# Patient Record
Sex: MALE | Race: WHITE | NOT HISPANIC OR LATINO | Employment: UNEMPLOYED | URBAN - METROPOLITAN AREA
[De-identification: names, ages, dates, MRNs, and addresses within clinical notes are randomized per-mention and may not be internally consistent; named-entity substitution may affect disease eponyms.]

---

## 2018-08-01 ENCOUNTER — RECORDS - HEALTHEAST (OUTPATIENT)
Dept: LAB | Facility: CLINIC | Age: 23
End: 2018-08-01

## 2018-08-02 LAB — HBA1C MFR BLD: 8.6 % (ref 4.2–6.1)

## 2018-08-06 ENCOUNTER — ANESTHESIA - HEALTHEAST (OUTPATIENT)
Dept: SURGERY | Facility: HOSPITAL | Age: 23
End: 2018-08-06

## 2018-08-06 ASSESSMENT — MIFFLIN-ST. JEOR: SCORE: 1800.25

## 2018-08-07 ENCOUNTER — SURGERY - HEALTHEAST (OUTPATIENT)
Dept: SURGERY | Facility: HOSPITAL | Age: 23
End: 2018-08-07

## 2018-08-09 ASSESSMENT — MIFFLIN-ST. JEOR: SCORE: 1806.6

## 2021-06-01 VITALS — HEIGHT: 70 IN | BODY MASS INDEX: 25.83 KG/M2 | WEIGHT: 180.4 LBS

## 2021-06-16 PROBLEM — M26.02 MAXILLARY HYPOPLASIA: Status: ACTIVE | Noted: 2018-08-07

## 2021-06-16 PROBLEM — E10.65 TYPE 1 DIABETES MELLITUS WITH HYPERGLYCEMIA (H): Status: ACTIVE | Noted: 2018-08-07

## 2021-06-19 NOTE — ANESTHESIA POSTPROCEDURE EVALUATION
Patient: Champ Baker  LEFORT 1 OSTEOTOMY BILATERAL SAGITTAL SPLIT RAMUS OSTEOTOMY  Anesthesia type: general    Patient location: PACU  Last vitals:   Vitals:    08/07/18 1230   BP: 130/76   Pulse: 97   Resp: 19   Temp:    SpO2: 95%     Post vital signs: stable  Level of consciousness: responds to simple questions and sedated  Post-anesthesia pain: pain controlled  Post-anesthesia nausea and vomiting: no  Pulmonary: unassisted, return to baseline  Cardiovascular: stable and blood pressure at baseline  Hydration: adequate  Anesthetic events: no    QCDR Measures:  ASA# 11 - Megan-op Cardiac Arrest: ASA11B - Patient did NOT experience unanticipated cardiac arrest  ASA# 12 - Megan-op Mortality Rate: ASA12B - Patient did NOT die  ASA# 13 - PACU Re-Intubation Rate: ASA13B - Patient did NOT require a new airway mgmt  ASA# 10 - Composite Anes Safety: ASA10A - No serious adverse event    Additional Notes:

## 2021-06-19 NOTE — ANESTHESIA CARE TRANSFER NOTE
Last vitals:   Vitals:    08/07/18 1150   BP: 114/58   Pulse: 85   Resp: 16   Temp: 36.7  C (98.1  F)   SpO2: 95%     Patient's level of consciousness is unresponsive  Spontaneous respirations: yes  Maintains airway independently: yes  Dentition unchanged: yes  Oropharynx: oropharynx clear of all foreign objects    QCDR Measures:  ASA# 20 - Surgical Safety Checklist: WHO surgical safety checklist completed prior to induction  PQRS# 430 - Adult PONV Prevention: 4558F - Pt received => 2 anti-emetic agents (different classes) preop & intraop  ASA# 8 - Peds PONV Prevention: NA - Not pediatric patient, not GA or 2 or more risk factors NOT present  PQRS# 424 - Megan-op Temp Management: 4559F - At least one body temp DOCUMENTED => 35.5C or 95.9F within required timeframe  PQRS# 426 - PACU Transfer Protocol: - Transfer of care checklist used  ASA# 14 - Acute Post-op Pain: ASA14B - Patient did NOT experience pain >= 7 out of 10

## 2021-06-19 NOTE — ANESTHESIA PREPROCEDURE EVALUATION
Anesthesia Evaluation      Patient summary reviewed   No history of anesthetic complications     Airway   Mallampati: I  Neck ROM: full   Pulmonary - negative ROS and normal exam                          Cardiovascular - negative ROS and normal exam  Exercise tolerance: > or = 4 METS  (-) murmur  Rhythm: regular  Rate: normal,    no murmur      Neuro/Psych    (+) anxiety/panic attacks,     Endo/Other    (+) diabetes mellitus type 1 well controlled and poorly controlled using insulin,      GI/Hepatic/Renal - negative ROS      Other findings: Tourette's  HbA1c recently 8.5. Has received a new feedback pump.   today      Dental - normal exam                        Anesthesia Plan  Planned anesthetic: general endotracheal  Deliberate hypotension  ASA 2   Induction: intravenous   Anesthetic plan and risks discussed with: patient and parent/guardian  Anesthesia plan special considerations: antiemetics,   Post-op plan: routine recovery

## 2022-04-05 DIAGNOSIS — Z11.59 ENCOUNTER FOR SCREENING FOR OTHER VIRAL DISEASES: Primary | ICD-10-CM

## 2022-04-29 ENCOUNTER — LAB (OUTPATIENT)
Dept: LAB | Facility: CLINIC | Age: 27
End: 2022-04-29
Attending: DENTIST
Payer: COMMERCIAL

## 2022-04-29 DIAGNOSIS — Z11.59 ENCOUNTER FOR SCREENING FOR OTHER VIRAL DISEASES: ICD-10-CM

## 2022-04-29 PROCEDURE — U0005 INFEC AGEN DETEC AMPLI PROBE: HCPCS

## 2022-04-29 PROCEDURE — U0003 INFECTIOUS AGENT DETECTION BY NUCLEIC ACID (DNA OR RNA); SEVERE ACUTE RESPIRATORY SYNDROME CORONAVIRUS 2 (SARS-COV-2) (CORONAVIRUS DISEASE [COVID-19]), AMPLIFIED PROBE TECHNIQUE, MAKING USE OF HIGH THROUGHPUT TECHNOLOGIES AS DESCRIBED BY CMS-2020-01-R: HCPCS

## 2022-04-29 RX ORDER — CLONAZEPAM 0.5 MG/1
0.5 TABLET ORAL 2 TIMES DAILY PRN
COMMUNITY

## 2022-04-29 RX ORDER — HYDROXYZINE HYDROCHLORIDE 10 MG/1
10 TABLET, FILM COATED ORAL EVERY 8 HOURS PRN
COMMUNITY

## 2022-04-29 RX ORDER — VENLAFAXINE HYDROCHLORIDE 150 MG/1
150 TABLET, EXTENDED RELEASE ORAL DAILY
COMMUNITY

## 2022-04-29 RX ORDER — BUSPIRONE HYDROCHLORIDE 5 MG/1
5 TABLET ORAL 2 TIMES DAILY
COMMUNITY

## 2022-04-30 LAB — SARS-COV-2 RNA RESP QL NAA+PROBE: NEGATIVE

## 2022-05-02 ENCOUNTER — ANESTHESIA EVENT (OUTPATIENT)
Dept: SURGERY | Facility: HOSPITAL | Age: 27
End: 2022-05-02
Payer: COMMERCIAL

## 2022-05-03 ENCOUNTER — HOSPITAL ENCOUNTER (OUTPATIENT)
Facility: HOSPITAL | Age: 27
Discharge: HOME | End: 2022-05-05
Attending: DENTIST | Admitting: DENTIST
Payer: COMMERCIAL

## 2022-05-03 ENCOUNTER — MEDICAL CORRESPONDENCE (OUTPATIENT)
Dept: HEALTH INFORMATION MANAGEMENT | Facility: CLINIC | Age: 27
End: 2022-05-03
Payer: COMMERCIAL

## 2022-05-03 ENCOUNTER — ANESTHESIA (OUTPATIENT)
Dept: SURGERY | Facility: HOSPITAL | Age: 27
End: 2022-05-03
Payer: COMMERCIAL

## 2022-05-03 DIAGNOSIS — S02.401K: Primary | ICD-10-CM

## 2022-05-03 LAB
GLUCOSE BLDC GLUCOMTR-MCNC: 146 MG/DL (ref 70–99)
GLUCOSE BLDC GLUCOMTR-MCNC: 200 MG/DL (ref 70–99)

## 2022-05-03 PROCEDURE — 250N000025 HC SEVOFLURANE, PER MIN: Performed by: DENTIST

## 2022-05-03 PROCEDURE — 250N000013 HC RX MED GY IP 250 OP 250 PS 637: Performed by: DENTIST

## 2022-05-03 PROCEDURE — 360N000077 HC SURGERY LEVEL 4, PER MIN: Performed by: DENTIST

## 2022-05-03 PROCEDURE — 999N000141 HC STATISTIC PRE-PROCEDURE NURSING ASSESSMENT: Performed by: DENTIST

## 2022-05-03 PROCEDURE — 278N000051 HC OR IMPLANT GENERAL: Performed by: DENTIST

## 2022-05-03 PROCEDURE — 258N000003 HC RX IP 258 OP 636: Performed by: NURSE ANESTHETIST, CERTIFIED REGISTERED

## 2022-05-03 PROCEDURE — 250N000011 HC RX IP 250 OP 636: Performed by: NURSE ANESTHETIST, CERTIFIED REGISTERED

## 2022-05-03 PROCEDURE — 250N000011 HC RX IP 250 OP 636: Performed by: ANESTHESIOLOGY

## 2022-05-03 PROCEDURE — 250N000009 HC RX 250: Performed by: NURSE ANESTHETIST, CERTIFIED REGISTERED

## 2022-05-03 PROCEDURE — C1762 CONN TISS, HUMAN(INC FASCIA): HCPCS | Performed by: DENTIST

## 2022-05-03 PROCEDURE — 258N000003 HC RX IP 258 OP 636: Performed by: DENTIST

## 2022-05-03 PROCEDURE — 710N000009 HC RECOVERY PHASE 1, LEVEL 1, PER MIN: Performed by: DENTIST

## 2022-05-03 PROCEDURE — 250N000011 HC RX IP 250 OP 636: Performed by: DENTIST

## 2022-05-03 PROCEDURE — C1713 ANCHOR/SCREW BN/BN,TIS/BN: HCPCS | Performed by: DENTIST

## 2022-05-03 PROCEDURE — 82962 GLUCOSE BLOOD TEST: CPT

## 2022-05-03 PROCEDURE — 99213 OFFICE O/P EST LOW 20 MIN: CPT | Performed by: INTERNAL MEDICINE

## 2022-05-03 PROCEDURE — 272N000001 HC OR GENERAL SUPPLY STERILE: Performed by: DENTIST

## 2022-05-03 PROCEDURE — 258N000003 HC RX IP 258 OP 636: Performed by: ANESTHESIOLOGY

## 2022-05-03 PROCEDURE — L8699 PROSTHETIC IMPLANT NOS: HCPCS | Performed by: DENTIST

## 2022-05-03 PROCEDURE — 370N000017 HC ANESTHESIA TECHNICAL FEE, PER MIN: Performed by: DENTIST

## 2022-05-03 PROCEDURE — C1760 CLOSURE DEV, VASC: HCPCS | Performed by: DENTIST

## 2022-05-03 PROCEDURE — 250N000009 HC RX 250: Performed by: DENTIST

## 2022-05-03 DEVICE — IMPLANTABLE DEVICE: Type: IMPLANTABLE DEVICE | Site: MOUTH | Status: FUNCTIONAL

## 2022-05-03 DEVICE — SCREW, MAXDRIVE, L1 MMF, DRILL FREE, TI-6AL-4V
Type: IMPLANTABLE DEVICE | Site: MOUTH | Status: FUNCTIONAL
Brand: LEVEL ONE CMF, STERILE

## 2022-05-03 DEVICE — GRAFT BONE CRUSH CANC 15ML 400075: Type: IMPLANTABLE DEVICE | Site: MOUTH | Status: FUNCTIONAL

## 2022-05-03 DEVICE — GRAFT BONE INFUSE BMP SM 7510200: Type: IMPLANTABLE DEVICE | Site: MOUTH | Status: FUNCTIONAL

## 2022-05-03 DEVICE — IMP SCR KLS DRILL-FREE MAXDRIVE MINI 2.0X7MM TI-6AL-4V: Type: IMPLANTABLE DEVICE | Site: MOUTH | Status: FUNCTIONAL

## 2022-05-03 RX ORDER — ONDANSETRON 4 MG/1
4 TABLET, ORALLY DISINTEGRATING ORAL EVERY 30 MIN PRN
Status: DISCONTINUED | OUTPATIENT
Start: 2022-05-03 | End: 2022-05-03 | Stop reason: HOSPADM

## 2022-05-03 RX ORDER — FAMOTIDINE 20 MG/1
20 TABLET, FILM COATED ORAL 2 TIMES DAILY
Status: DISCONTINUED | OUTPATIENT
Start: 2022-05-03 | End: 2022-05-05 | Stop reason: HOSPADM

## 2022-05-03 RX ORDER — HYDROMORPHONE HYDROCHLORIDE 1 MG/ML
0.5 INJECTION, SOLUTION INTRAMUSCULAR; INTRAVENOUS; SUBCUTANEOUS
Status: DISCONTINUED | OUTPATIENT
Start: 2022-05-03 | End: 2022-05-05 | Stop reason: HOSPADM

## 2022-05-03 RX ORDER — OXYCODONE HYDROCHLORIDE 15 MG/1
15 TABLET ORAL EVERY 4 HOURS PRN
Status: DISCONTINUED | OUTPATIENT
Start: 2022-05-03 | End: 2022-05-05 | Stop reason: HOSPADM

## 2022-05-03 RX ORDER — LIDOCAINE HYDROCHLORIDE AND EPINEPHRINE BITARTRATE 20; .01 MG/ML; MG/ML
INJECTION, SOLUTION SUBCUTANEOUS PRN
Status: DISCONTINUED | OUTPATIENT
Start: 2022-05-03 | End: 2022-05-03 | Stop reason: HOSPADM

## 2022-05-03 RX ORDER — POLYETHYLENE GLYCOL 3350 17 G/17G
17 POWDER, FOR SOLUTION ORAL DAILY
Status: DISCONTINUED | OUTPATIENT
Start: 2022-05-04 | End: 2022-05-05 | Stop reason: HOSPADM

## 2022-05-03 RX ORDER — ONDANSETRON 2 MG/ML
4 INJECTION INTRAMUSCULAR; INTRAVENOUS EVERY 30 MIN PRN
Status: DISCONTINUED | OUTPATIENT
Start: 2022-05-03 | End: 2022-05-03 | Stop reason: HOSPADM

## 2022-05-03 RX ORDER — HYDROXYZINE HYDROCHLORIDE 10 MG/1
10 TABLET, FILM COATED ORAL EVERY 8 HOURS PRN
Status: DISCONTINUED | OUTPATIENT
Start: 2022-05-03 | End: 2022-05-05 | Stop reason: HOSPADM

## 2022-05-03 RX ORDER — PROPOFOL 10 MG/ML
INJECTION, EMULSION INTRAVENOUS PRN
Status: DISCONTINUED | OUTPATIENT
Start: 2022-05-03 | End: 2022-05-03

## 2022-05-03 RX ORDER — LIDOCAINE HYDROCHLORIDE 20 MG/ML
INJECTION, SOLUTION INFILTRATION; PERINEURAL PRN
Status: DISCONTINUED | OUTPATIENT
Start: 2022-05-03 | End: 2022-05-03

## 2022-05-03 RX ORDER — CHLORHEXIDINE GLUCONATE ORAL RINSE 1.2 MG/ML
15 SOLUTION DENTAL ONCE
Status: COMPLETED | OUTPATIENT
Start: 2022-05-03 | End: 2022-05-03

## 2022-05-03 RX ORDER — ACETAMINOPHEN 325 MG/1
650 TABLET ORAL EVERY 4 HOURS PRN
Status: DISCONTINUED | OUTPATIENT
Start: 2022-05-06 | End: 2022-05-05 | Stop reason: HOSPADM

## 2022-05-03 RX ORDER — OXYCODONE HYDROCHLORIDE 5 MG/1
5 TABLET ORAL EVERY 4 HOURS PRN
Status: DISCONTINUED | OUTPATIENT
Start: 2022-05-03 | End: 2022-05-03 | Stop reason: HOSPADM

## 2022-05-03 RX ORDER — ONDANSETRON 2 MG/ML
4 INJECTION INTRAMUSCULAR; INTRAVENOUS EVERY 4 HOURS PRN
Status: DISCONTINUED | OUTPATIENT
Start: 2022-05-03 | End: 2022-05-03 | Stop reason: HOSPADM

## 2022-05-03 RX ORDER — DIPHENHYDRAMINE HYDROCHLORIDE 50 MG/ML
25 INJECTION INTRAMUSCULAR; INTRAVENOUS EVERY 6 HOURS PRN
Status: DISCONTINUED | OUTPATIENT
Start: 2022-05-03 | End: 2022-05-05 | Stop reason: HOSPADM

## 2022-05-03 RX ORDER — CEFAZOLIN SODIUM/WATER 2 G/20 ML
2 SYRINGE (ML) INTRAVENOUS
Status: COMPLETED | OUTPATIENT
Start: 2022-05-03 | End: 2022-05-03

## 2022-05-03 RX ORDER — ATORVASTATIN CALCIUM 10 MG/1
10 TABLET, FILM COATED ORAL AT BEDTIME
Status: DISCONTINUED | OUTPATIENT
Start: 2022-05-03 | End: 2022-05-05 | Stop reason: HOSPADM

## 2022-05-03 RX ORDER — LIDOCAINE 40 MG/G
CREAM TOPICAL
Status: DISCONTINUED | OUTPATIENT
Start: 2022-05-03 | End: 2022-05-03 | Stop reason: HOSPADM

## 2022-05-03 RX ORDER — FENTANYL CITRATE 50 UG/ML
25 INJECTION, SOLUTION INTRAMUSCULAR; INTRAVENOUS EVERY 5 MIN PRN
Status: DISCONTINUED | OUTPATIENT
Start: 2022-05-03 | End: 2022-05-03 | Stop reason: HOSPADM

## 2022-05-03 RX ORDER — ACETAMINOPHEN 325 MG/1
975 TABLET ORAL EVERY 8 HOURS
Status: DISCONTINUED | OUTPATIENT
Start: 2022-05-03 | End: 2022-05-05 | Stop reason: HOSPADM

## 2022-05-03 RX ORDER — NALOXONE HYDROCHLORIDE 0.4 MG/ML
0.2 INJECTION, SOLUTION INTRAMUSCULAR; INTRAVENOUS; SUBCUTANEOUS
Status: DISCONTINUED | OUTPATIENT
Start: 2022-05-03 | End: 2022-05-05 | Stop reason: HOSPADM

## 2022-05-03 RX ORDER — NICOTINE POLACRILEX 4 MG
15-30 LOZENGE BUCCAL
Status: DISCONTINUED | OUTPATIENT
Start: 2022-05-03 | End: 2022-05-05 | Stop reason: HOSPADM

## 2022-05-03 RX ORDER — ONDANSETRON 4 MG/1
8 TABLET, ORALLY DISINTEGRATING ORAL EVERY 6 HOURS PRN
Status: DISCONTINUED | OUTPATIENT
Start: 2022-05-03 | End: 2022-05-03 | Stop reason: HOSPADM

## 2022-05-03 RX ORDER — OXYCODONE HYDROCHLORIDE 5 MG/1
10 TABLET ORAL EVERY 4 HOURS PRN
Status: DISCONTINUED | OUTPATIENT
Start: 2022-05-03 | End: 2022-05-05 | Stop reason: HOSPADM

## 2022-05-03 RX ORDER — BUSPIRONE HYDROCHLORIDE 5 MG/1
5 TABLET ORAL 2 TIMES DAILY
Status: DISCONTINUED | OUTPATIENT
Start: 2022-05-03 | End: 2022-05-05 | Stop reason: HOSPADM

## 2022-05-03 RX ORDER — HYDROMORPHONE HYDROCHLORIDE 1 MG/ML
0.2 INJECTION, SOLUTION INTRAMUSCULAR; INTRAVENOUS; SUBCUTANEOUS EVERY 5 MIN PRN
Status: DISCONTINUED | OUTPATIENT
Start: 2022-05-03 | End: 2022-05-03 | Stop reason: HOSPADM

## 2022-05-03 RX ORDER — NALOXONE HYDROCHLORIDE 0.4 MG/ML
0.4 INJECTION, SOLUTION INTRAMUSCULAR; INTRAVENOUS; SUBCUTANEOUS
Status: DISCONTINUED | OUTPATIENT
Start: 2022-05-03 | End: 2022-05-05 | Stop reason: HOSPADM

## 2022-05-03 RX ORDER — AMOXICILLIN 250 MG
1 CAPSULE ORAL 2 TIMES DAILY
Status: DISCONTINUED | OUTPATIENT
Start: 2022-05-03 | End: 2022-05-05 | Stop reason: HOSPADM

## 2022-05-03 RX ORDER — SODIUM CHLORIDE 9 MG/ML
INJECTION, SOLUTION INTRAVENOUS CONTINUOUS
Status: DISCONTINUED | OUTPATIENT
Start: 2022-05-03 | End: 2022-05-05 | Stop reason: HOSPADM

## 2022-05-03 RX ORDER — ONDANSETRON 4 MG/1
4 TABLET, ORALLY DISINTEGRATING ORAL EVERY 6 HOURS PRN
Status: DISCONTINUED | OUTPATIENT
Start: 2022-05-03 | End: 2022-05-05 | Stop reason: HOSPADM

## 2022-05-03 RX ORDER — AMPICILLIN 1 G/1
1 INJECTION, POWDER, FOR SOLUTION INTRAMUSCULAR; INTRAVENOUS EVERY 8 HOURS
Status: DISCONTINUED | OUTPATIENT
Start: 2022-05-03 | End: 2022-05-05 | Stop reason: HOSPADM

## 2022-05-03 RX ORDER — MEPERIDINE HYDROCHLORIDE 25 MG/ML
12.5 INJECTION INTRAMUSCULAR; INTRAVENOUS; SUBCUTANEOUS
Status: DISCONTINUED | OUTPATIENT
Start: 2022-05-03 | End: 2022-05-03

## 2022-05-03 RX ORDER — PROCHLORPERAZINE MALEATE 10 MG
10 TABLET ORAL EVERY 6 HOURS PRN
Status: DISCONTINUED | OUTPATIENT
Start: 2022-05-03 | End: 2022-05-05 | Stop reason: HOSPADM

## 2022-05-03 RX ORDER — KETAMINE HYDROCHLORIDE 10 MG/ML
INJECTION INTRAMUSCULAR; INTRAVENOUS PRN
Status: DISCONTINUED | OUTPATIENT
Start: 2022-05-03 | End: 2022-05-03

## 2022-05-03 RX ORDER — METHYLPREDNISOLONE SODIUM SUCCINATE 125 MG/2ML
125 INJECTION, POWDER, LYOPHILIZED, FOR SOLUTION INTRAMUSCULAR; INTRAVENOUS ONCE
Status: DISCONTINUED | OUTPATIENT
Start: 2022-05-03 | End: 2022-05-03 | Stop reason: HOSPADM

## 2022-05-03 RX ORDER — DIPHENHYDRAMINE HCL 25 MG
25 CAPSULE ORAL EVERY 6 HOURS PRN
Status: DISCONTINUED | OUTPATIENT
Start: 2022-05-03 | End: 2022-05-05 | Stop reason: HOSPADM

## 2022-05-03 RX ORDER — KETOROLAC TROMETHAMINE 15 MG/ML
15 INJECTION, SOLUTION INTRAMUSCULAR; INTRAVENOUS EVERY 6 HOURS
Status: COMPLETED | OUTPATIENT
Start: 2022-05-03 | End: 2022-05-04

## 2022-05-03 RX ORDER — DEXAMETHASONE SODIUM PHOSPHATE 10 MG/ML
INJECTION, SOLUTION INTRAMUSCULAR; INTRAVENOUS PRN
Status: DISCONTINUED | OUTPATIENT
Start: 2022-05-03 | End: 2022-05-03

## 2022-05-03 RX ORDER — LIDOCAINE 40 MG/G
CREAM TOPICAL
Status: DISCONTINUED | OUTPATIENT
Start: 2022-05-03 | End: 2022-05-05 | Stop reason: HOSPADM

## 2022-05-03 RX ORDER — FENTANYL CITRATE 50 UG/ML
25 INJECTION, SOLUTION INTRAMUSCULAR; INTRAVENOUS
Status: DISCONTINUED | OUTPATIENT
Start: 2022-05-03 | End: 2022-05-03 | Stop reason: HOSPADM

## 2022-05-03 RX ORDER — SODIUM CHLORIDE, SODIUM LACTATE, POTASSIUM CHLORIDE, CALCIUM CHLORIDE 600; 310; 30; 20 MG/100ML; MG/100ML; MG/100ML; MG/100ML
INJECTION, SOLUTION INTRAVENOUS CONTINUOUS
Status: DISCONTINUED | OUTPATIENT
Start: 2022-05-03 | End: 2022-05-03 | Stop reason: HOSPADM

## 2022-05-03 RX ORDER — BISACODYL 10 MG
10 SUPPOSITORY, RECTAL RECTAL DAILY PRN
Status: DISCONTINUED | OUTPATIENT
Start: 2022-05-03 | End: 2022-05-05 | Stop reason: HOSPADM

## 2022-05-03 RX ORDER — ONDANSETRON 2 MG/ML
INJECTION INTRAMUSCULAR; INTRAVENOUS PRN
Status: DISCONTINUED | OUTPATIENT
Start: 2022-05-03 | End: 2022-05-03

## 2022-05-03 RX ORDER — CLONAZEPAM 0.5 MG/1
0.5 TABLET ORAL 2 TIMES DAILY PRN
Status: DISCONTINUED | OUTPATIENT
Start: 2022-05-03 | End: 2022-05-05 | Stop reason: HOSPADM

## 2022-05-03 RX ORDER — VENLAFAXINE HYDROCHLORIDE 150 MG/1
150 CAPSULE, EXTENDED RELEASE ORAL DAILY
Status: DISCONTINUED | OUTPATIENT
Start: 2022-05-03 | End: 2022-05-05 | Stop reason: HOSPADM

## 2022-05-03 RX ORDER — GLYCOPYRROLATE 0.2 MG/ML
INJECTION, SOLUTION INTRAMUSCULAR; INTRAVENOUS PRN
Status: DISCONTINUED | OUTPATIENT
Start: 2022-05-03 | End: 2022-05-03

## 2022-05-03 RX ORDER — DEXTROSE MONOHYDRATE 25 G/50ML
25-50 INJECTION, SOLUTION INTRAVENOUS
Status: DISCONTINUED | OUTPATIENT
Start: 2022-05-03 | End: 2022-05-05 | Stop reason: HOSPADM

## 2022-05-03 RX ORDER — FENTANYL CITRATE 50 UG/ML
INJECTION, SOLUTION INTRAMUSCULAR; INTRAVENOUS PRN
Status: DISCONTINUED | OUTPATIENT
Start: 2022-05-03 | End: 2022-05-03

## 2022-05-03 RX ORDER — ONDANSETRON 2 MG/ML
4 INJECTION INTRAMUSCULAR; INTRAVENOUS EVERY 6 HOURS PRN
Status: DISCONTINUED | OUTPATIENT
Start: 2022-05-03 | End: 2022-05-05 | Stop reason: HOSPADM

## 2022-05-03 RX ADMIN — CHLORHEXIDINE GLUCONATE 15 ML: 1.2 SOLUTION ORAL at 12:32

## 2022-05-03 RX ADMIN — ROCURONIUM BROMIDE 50 MG: 50 INJECTION, SOLUTION INTRAVENOUS at 13:29

## 2022-05-03 RX ADMIN — PHENYLEPHRINE HYDROCHLORIDE 100 MCG: 10 INJECTION INTRAVENOUS at 15:19

## 2022-05-03 RX ADMIN — KETOROLAC TROMETHAMINE 15 MG: 15 INJECTION, SOLUTION INTRAMUSCULAR; INTRAVENOUS at 20:12

## 2022-05-03 RX ADMIN — ONDANSETRON 4 MG: 2 INJECTION INTRAMUSCULAR; INTRAVENOUS at 17:43

## 2022-05-03 RX ADMIN — PHENYLEPHRINE HYDROCHLORIDE 50 MCG: 10 INJECTION INTRAVENOUS at 14:51

## 2022-05-03 RX ADMIN — FENTANYL CITRATE 25 MCG: 50 INJECTION INTRAMUSCULAR; INTRAVENOUS at 17:36

## 2022-05-03 RX ADMIN — VENLAFAXINE HYDROCHLORIDE 150 MG: 150 CAPSULE, EXTENDED RELEASE ORAL at 20:13

## 2022-05-03 RX ADMIN — HYDROMORPHONE HYDROCHLORIDE 0.5 MG: 1 INJECTION, SOLUTION INTRAMUSCULAR; INTRAVENOUS; SUBCUTANEOUS at 18:45

## 2022-05-03 RX ADMIN — PROCHLORPERAZINE EDISYLATE 5 MG: 5 INJECTION INTRAMUSCULAR; INTRAVENOUS at 18:23

## 2022-05-03 RX ADMIN — PROPOFOL 200 MG: 10 INJECTION, EMULSION INTRAVENOUS at 13:29

## 2022-05-03 RX ADMIN — ATORVASTATIN CALCIUM 10 MG: 10 TABLET, FILM COATED ORAL at 20:15

## 2022-05-03 RX ADMIN — GLYCOPYRROLATE 0.2 MG: 0.2 INJECTION, SOLUTION INTRAMUSCULAR; INTRAVENOUS at 13:29

## 2022-05-03 RX ADMIN — FENTANYL CITRATE 50 MCG: 50 INJECTION, SOLUTION INTRAMUSCULAR; INTRAVENOUS at 13:29

## 2022-05-03 RX ADMIN — LIDOCAINE HYDROCHLORIDE 100 MG: 20 INJECTION, SOLUTION INFILTRATION; PERINEURAL at 13:29

## 2022-05-03 RX ADMIN — PHENYLEPHRINE HYDROCHLORIDE 100 MCG: 10 INJECTION INTRAVENOUS at 14:55

## 2022-05-03 RX ADMIN — PHENYLEPHRINE HYDROCHLORIDE 100 MCG: 10 INJECTION INTRAVENOUS at 13:29

## 2022-05-03 RX ADMIN — SODIUM CHLORIDE, POTASSIUM CHLORIDE, SODIUM LACTATE AND CALCIUM CHLORIDE: 600; 310; 30; 20 INJECTION, SOLUTION INTRAVENOUS at 18:13

## 2022-05-03 RX ADMIN — Medication 2 G: at 13:16

## 2022-05-03 RX ADMIN — KETAMINE HYDROCHLORIDE 50 MG: 10 INJECTION, SOLUTION INTRAMUSCULAR; INTRAVENOUS at 13:29

## 2022-05-03 RX ADMIN — PHENYLEPHRINE HYDROCHLORIDE 50 MCG: 10 INJECTION INTRAVENOUS at 15:15

## 2022-05-03 RX ADMIN — SODIUM CHLORIDE: 9 INJECTION, SOLUTION INTRAVENOUS at 19:04

## 2022-05-03 RX ADMIN — SENNOSIDES AND DOCUSATE SODIUM 1 TABLET: 50; 8.6 TABLET ORAL at 20:15

## 2022-05-03 RX ADMIN — BUSPIRONE HYDROCHLORIDE 5 MG: 5 TABLET ORAL at 20:36

## 2022-05-03 RX ADMIN — MIDAZOLAM 2 MG: 1 INJECTION INTRAMUSCULAR; INTRAVENOUS at 13:17

## 2022-05-03 RX ADMIN — SUGAMMADEX 180 MG: 100 INJECTION, SOLUTION INTRAVENOUS at 16:32

## 2022-05-03 RX ADMIN — FENTANYL CITRATE 25 MCG: 50 INJECTION INTRAMUSCULAR; INTRAVENOUS at 17:52

## 2022-05-03 RX ADMIN — DEXAMETHASONE SODIUM PHOSPHATE 5 MG: 10 INJECTION, SOLUTION INTRAMUSCULAR; INTRAVENOUS at 14:37

## 2022-05-03 RX ADMIN — HYDROMORPHONE HYDROCHLORIDE 0.5 MG: 1 INJECTION, SOLUTION INTRAMUSCULAR; INTRAVENOUS; SUBCUTANEOUS at 14:23

## 2022-05-03 RX ADMIN — FENTANYL CITRATE 25 MCG: 50 INJECTION INTRAMUSCULAR; INTRAVENOUS at 17:47

## 2022-05-03 RX ADMIN — SODIUM CHLORIDE, POTASSIUM CHLORIDE, SODIUM LACTATE AND CALCIUM CHLORIDE: 600; 310; 30; 20 INJECTION, SOLUTION INTRAVENOUS at 14:55

## 2022-05-03 RX ADMIN — PHENYLEPHRINE HYDROCHLORIDE 50 MCG: 10 INJECTION INTRAVENOUS at 15:03

## 2022-05-03 RX ADMIN — ACETAMINOPHEN 975 MG: 325 TABLET ORAL at 20:14

## 2022-05-03 RX ADMIN — CLONAZEPAM 0.5 MG: 0.5 TABLET ORAL at 23:43

## 2022-05-03 RX ADMIN — FENTANYL CITRATE 50 MCG: 50 INJECTION, SOLUTION INTRAMUSCULAR; INTRAVENOUS at 13:24

## 2022-05-03 RX ADMIN — FENTANYL CITRATE 25 MCG: 50 INJECTION INTRAMUSCULAR; INTRAVENOUS at 17:23

## 2022-05-03 RX ADMIN — HYDROMORPHONE HYDROCHLORIDE 0.5 MG: 1 INJECTION, SOLUTION INTRAMUSCULAR; INTRAVENOUS; SUBCUTANEOUS at 13:59

## 2022-05-03 RX ADMIN — SODIUM CHLORIDE, POTASSIUM CHLORIDE, SODIUM LACTATE AND CALCIUM CHLORIDE: 600; 310; 30; 20 INJECTION, SOLUTION INTRAVENOUS at 12:03

## 2022-05-03 RX ADMIN — FAMOTIDINE 20 MG: 20 TABLET ORAL at 20:15

## 2022-05-03 RX ADMIN — AMPICILLIN SODIUM 1 G: 1 INJECTION, POWDER, FOR SOLUTION INTRAMUSCULAR; INTRAVENOUS at 20:17

## 2022-05-03 RX ADMIN — ONDANSETRON 4 MG: 2 INJECTION INTRAMUSCULAR; INTRAVENOUS at 16:26

## 2022-05-03 NOTE — ANESTHESIA PROCEDURE NOTES
Airway         Procedure Start/Stop Times: 5/3/2022 1:37 PM  Staff -        Anesthesiologist:  Franco Christie MD       Performed By: anesthesiologistIndications and Patient Condition       Indications for airway management: jimmie-procedural       Induction type:intravenous       Mask difficulty assessment: 1 - vent by mask    Final Airway Details       Final airway type: endotracheal airway       Successful airway: Nasal and GAVINO  Endotracheal Airway Details        ETT size (mm): 7.0       Cuffed: yes       Successful intubation technique: direct laryngoscopy       DL Blade Type: [place] 2       Grade View of Cords: 1       Adjucts: magill forceps       Position: Center       Measured from: nares    Post intubation assessment        Placement verified by: capnometry, equal breath sounds and chest rise        Number of attempts at approach: 1       Number of other approaches attempted: 0       Ease of procedure: easy       Dentition: Intact    Medication(s) Administered   Medication Administration Time: 5/3/2022 1:37 PM

## 2022-05-03 NOTE — PHARMACY-ADMISSION MEDICATION HISTORY
Pharmacy Note - Admission Medication History   ______________________________________________________________________    Prior To Admission (PTA) med list completed and updated in EMR.       PTA Med List   Medication Sig Last Dose     atorvastatin (LIPITOR) 10 MG tablet [ATORVASTATIN (LIPITOR) 10 MG TABLET] Take 10 mg by mouth at bedtime. 5/2/2022 at hs     busPIRone (BUSPAR) 5 MG tablet Take 5 mg by mouth 2 times daily 5/2/2022 at pm     clonazePAM (KLONOPIN) 0.5 MG tablet Take 0.5 mg by mouth 2 times daily as needed for anxiety 5/2/2022 at pm     glucagon 1 MG kit 1 mg as needed      hydrOXYzine (ATARAX) 10 MG tablet Take 10 mg by mouth every 8 hours as needed for anxiety      insulin aspart (NovoLOG) inject - to fill ambulatory pump by Device route See Admin Instructions 110 units daily      venlafaxine (EFFEXOR-ER) 150 MG 24 hr tablet Take 150 mg by mouth daily 5/2/2022 at am       Information source(s): Patient and Clinic records    Method of interview communication: in-person    Patient was asked about OTC/herbal products specifically.  PTA med list reflects this.    Based on the pharmacist's assessment, the PTA med list information appears reliable    Allergies were reviewed, assessed, and updated with the patient.      Medications available for use during hospital stay: insulin pump .      Thank you for the opportunity to participate in the care of this patient.      Eliot Wiggins [facility]     5/3/2022     11:51 AM

## 2022-05-03 NOTE — ANESTHESIA POSTPROCEDURE EVALUATION
Patient: Champ Baker    Procedure: Procedure(s):  OPEN TREATMENT OF MAXILLARY FRACTURE DUE TO NONUNION       Anesthesia Type:  General    Note:  Disposition: Outpatient   Postop Pain Control: Uneventful            Sign Out: Well controlled pain   PONV: No   Neuro/Psych: Uneventful            Sign Out: Acceptable/Baseline neuro status   Airway/Respiratory: Uneventful            Sign Out: Acceptable/Baseline resp. status   CV/Hemodynamics: Uneventful            Sign Out: Acceptable CV status; No obvious hypovolemia; No obvious fluid overload   Other NRE: NONE   DID A NON-ROUTINE EVENT OCCUR? No           Last vitals:  Vitals Value Taken Time   /79 05/03/22 1653   Temp 37.1  C (98.8  F) 05/03/22 1653   Pulse 100 05/03/22 1649   Resp 14 05/03/22 1653   SpO2 98 % 05/03/22 1653       Electronically Signed By: Chetan Ramsey MD  May 3, 2022  5:08 PM

## 2022-05-03 NOTE — ANESTHESIA CARE TRANSFER NOTE
Patient: Champ Baker    Procedure: Procedure(s):  OPEN TREATMENT OF MAXILLARY FRACTURE DUE TO NONUNION       Diagnosis: LeFort I fracture with nonunion [S02.411K]  Diagnosis Additional Information: No value filed.    Anesthesia Type:   General     Note:    Oropharynx: oropharynx clear of all foreign objects and spontaneously breathing  Level of Consciousness: drowsy  Oxygen Supplementation: face mask  Level of Supplemental Oxygen (L/min / FiO2): 8  Independent Airway: airway patency satisfactory and stable  Dentition: dentition unchanged  Vital Signs Stable: post-procedure vital signs reviewed and stable  Report to RN Given: handoff report given  Patient transferred to: PACU  Comments: Patient spontaneously breathing.  Tidal volumes > 400ml.  Following commands, suctioned and extubated with balloon down.  O2 via mask at 8L.  To PACU, VSS, SBAR report to RN per institutional handoff policy and procedure.Transfer of care.    Dime sized red spot noted on pts forehead after extubation. ETT had been supported by sponge over towel-turban at end of case. Spot assessed by Dr Bernabe in PACU, PACU RN aware.  Handoff Report: Identifed the Patient, Identified the Reponsible Provider, Reviewed the pertinent medical history, Discussed the surgical course, Reviewed Intra-OP anesthesia mangement and issues during anesthesia, Set expectations for post-procedure period and Allowed opportunity for questions and acknowledgement of understanding      Vitals:  Vitals Value Taken Time   /79 05/03/22 1653   Temp 37.1  C (98.8  F) 05/03/22 1653   Pulse     Resp 14 05/03/22 1653   SpO2 98 % 05/03/22 1653       Electronically Signed By: AP Hinojosa CRNA  May 3, 2022  4:54 PM

## 2022-05-03 NOTE — OP NOTE
[facility]  Operative Note    Pre-operative diagnosis: LeFort I fracture with nonunion [S02.411K]   Post-operative diagnosis * No post-op diagnosis entered *   Procedure: Procedure(s):  OPEN TREATMENT OF MAXILLARY FRACTURE DUE TO NONUNION   Surgeon: Luis Enrique Bernabe DDS   Assistants(s): Vibha Grant   Anesthesia: General    Estimated blood loss: 300 ml    Total IV fluids: (See anesthesia record)   Blood transfusion: No transfusion was given during surgery   Total urine output: (See anesthesia record)   Drains: None   Specimens: None   Implants: 4X KLS alistair Washington fixation plates     Findings:   as expected.   Complications: None.   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  Several years ago he had undergone a large maxillary advancement and mandibular setback.  He had initial nice outcome for years but recently noted a change in his bite and mobility of his midface.  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  We discussed treatment options, risks and benefits.  The patient elected to move ahead with open debridement, reduction and internal fixation.  The potential risk and complications were discussed to include but not limited to; infection, bleeding, nonunion, malunion, damage to teeth, infraorbital nerve deficits and the potential need for additional surgery or treatment.    Patient was brought to the main operating room.  He was placed in the operating room table in supine position.  General anesthesia was induced with intravenous agents.  The patient was nasotracheally intubated.  Bilateral breath sounds were confirmed.  Nasotracheal tube was secured in usual fashion.  An injection timeout was completed.  Lidocaine with epinephrine was injected around the  planned surgical sites.  The patient was prepped and draped for surgery.  A timeout was completed.    A throat pack was placed using a Ray-Zhen gauze.  An incision was made in the prior vestibular scar 3 mm superior to the mucogingival junction from the zygomaxillary junction on 1 side to the zygomaxillary junction of the other.  There was considerable scar tissue and bony overgrowth of the fixation plates.  Very careful dissection was completed from the pterygomaxillary junction through the piriform rims.  The nasal floor mucosa was elevated.  LeFort fixation plates were exposed and removed.  Bone had grown over some of the fixation plates.  2 of the fixation plates were broken.  There was a fibrous union at the LeFort I level.  Careful dissection completed of the fibrous interface.  The dissection and osteotomies were completed through the LeFort I level and the pterygomaxillary junction through the piriform rim using a Piezo saw.  A single ball osteotome was used to the lateral nasal walls.  A double ball osteotome was used to separate the nasal septum.  The maxilla was down fractured.  Additional dissection was completed of fibrous scar tissue and the interface of the osteotomy segments.  The maxilla was further mobilized.  The wounds were thoroughly irrigated and suctioned.  There was good hemostasis.  The maxilla was mobilized using Chesetr disimpaction forceps.  The patient was placed into intermaxillary fixation using 26-gauge wire, ANISA Snowden surgical guide and ANISA Snowden rigid fixation arch bars.  Maxilla was reduced and fixated with 4 Holden midface bone plates produced by ANISA Snowden.  Intermaxillary fixation was released and the patient's occlusion was coincident with the splint and the surgical plan.  The wounds were thoroughly irrigated.  There was good hemostasis.  A passive alar base cinch suture was placed using 3-0 Vicryl suture.  A 1 cm VY closure was completed the maxillary vestibule using 4-0 Vicryl  suture.  The throat pack was removed.  An orogastric tube was passed suctioned and removed.  All sponge and needle counts were correct x2.  The patient was transported to the PACU in stable condition.

## 2022-05-03 NOTE — ANESTHESIA PREPROCEDURE EVALUATION
Anesthesia Pre-Procedure Evaluation    Patient: Champ Baker   MRN: [id number] : 1995        Procedure : Procedure(s):  OPEN TREATMENT OF MAXILLARY FRACTURE DUE TO NONUNION          Past Medical History:   Diagnosis Date     Anxiety      Diabetes mellitus type 1 (H)      Maxillary hypoplasia      Mixed hyperlipidemia      Situational depression       Past Surgical History:   Procedure Laterality Date     FINGER SURGERY       MANDIBULAR SAGITTAL SPLIT OSTEOTOMY W/ INTERNAL FIXATION N/A 2018    Procedure: LEFORT 1 OSTEOTOMY BILATERAL SAGITTAL SPLIT RAMUS OSTEOTOMY;  Surgeon: Luis Enrique Bernabe DDS;  Location: [facility] [place];  Service:      PERIPHERALLY INSERTED CENTRAL CATHETER INSERTION       WISDOM TOOTH EXTRACTION        Allergies   Allergen Reactions     Other Environmental Allergy      Cat Dander, Hay fever     Reglan [Metoclopramide] Anxiety      Social History     Tobacco Use     Smoking status: Never Smoker     Smokeless tobacco: Never Used   Substance Use Topics     Alcohol use: Yes     Comment: Alcoholic Drinks/day: rare      Wt Readings from Last 1 Encounters:   22 90.3 kg (199 lb 1.6 oz)        Anesthesia Evaluation   Pt has not had prior anesthetic         ROS/MED HX  ENT/Pulmonary:  - neg pulmonary ROS     Neurologic:  - neg neurologic ROS     Cardiovascular:  - neg cardiovascular ROS     METS/Exercise Tolerance: >4 METS    Hematologic:  - neg hematologic  ROS     Musculoskeletal:  - neg musculoskeletal ROS     GI/Hepatic:  - neg GI/hepatic ROS     Renal/Genitourinary:  - neg Renal ROS     Endo:     (+) type I DM, Last HgA1c: 7.3, Using insulin, - using insulin pump.     Psychiatric/Substance Use:     (+) psychiatric history anxiety     Infectious Disease:  - neg infectious disease ROS     Malignancy:  - neg malignancy ROS     Other:  - neg other ROS          Physical Exam    Airway  airway exam normal      Mallampati: I   TM distance: > 3 FB   Neck ROM: full   Mouth opening: > 3  cm    Respiratory Devices and Support         Dental  no notable dental history         Cardiovascular   cardiovascular exam normal          Pulmonary   pulmonary exam normal                OUTSIDE LABS:  CBC: No results found for: WBC, HGB, HCT, PLT  BMP:   Lab Results   Component Value Date     08/10/2018     08/09/2018    POTASSIUM 4.2 08/10/2018    POTASSIUM 3.9 08/09/2018    CHLORIDE 101 08/10/2018    CHLORIDE 105 08/09/2018    CO2 28 08/10/2018    CO2 30 08/09/2018    BUN 13 08/10/2018    BUN 16 08/09/2018    CR 0.83 08/10/2018    CR 0.82 08/09/2018     (H) 05/03/2022     08/10/2018     COAGS: No results found for: PTT, INR, FIBR  POC: No results found for: BGM, HCG, HCGS  HEPATIC: No results found for: ALBUMIN, PROTTOTAL, ALT, AST, GGT, ALKPHOS, BILITOTAL, BILIDIRECT, RICH  OTHER:   Lab Results   Component Value Date    A1C 8.6 (H) 08/01/2018    MANUEL 8.9 08/10/2018    PHOS 3.0 08/10/2018    MAG 2.4 08/08/2018       Anesthesia Plan    ASA Status:  2   NPO Status:  NPO Appropriate    Anesthesia Type: General.     - Airway: ETT   Induction: Intravenous, Propofol.   Maintenance: Balanced.   Techniques and Equipment:     - Airway: Nasal GAVINO         Consents    Anesthesia Plan(s) and associated risks, benefits, and realistic alternatives discussed. Questions answered and patient/representative(s) expressed understanding.    - Discussed:     - Discussed with:  Patient, Parent (Mother and/or Father)      - Extended Intubation/Ventilatory Support Discussed: No.      - Patient is DNR/DNI Status: No    Use of blood products discussed: No .     Postoperative Care    Pain management: IV analgesics, Multi-modal analgesia.   PONV prophylaxis: Ondansetron (or other 5HT-3), Dexamethasone or Solumedrol, Droperidol or Haldol     Comments:    Other Comments: 50 mg ketamine IV on induction.  Continue insulin pump and continuous glucose monitor in the OR.            Franco Christie MD

## 2022-05-04 LAB
GLUCOSE BLDC GLUCOMTR-MCNC: 168 MG/DL (ref 70–99)
GLUCOSE BLDC GLUCOMTR-MCNC: 181 MG/DL (ref 70–99)
GLUCOSE BLDC GLUCOMTR-MCNC: 191 MG/DL (ref 70–99)
HBA1C MFR BLD: 7.8 %

## 2022-05-04 PROCEDURE — 83036 HEMOGLOBIN GLYCOSYLATED A1C: CPT | Performed by: INTERNAL MEDICINE

## 2022-05-04 PROCEDURE — 250N000013 HC RX MED GY IP 250 OP 250 PS 637: Performed by: DENTIST

## 2022-05-04 PROCEDURE — 99226 PR SUBSEQUENT OBSERVATION CARE,LEVEL III: CPT | Performed by: INTERNAL MEDICINE

## 2022-05-04 PROCEDURE — 250N000011 HC RX IP 250 OP 636: Performed by: DENTIST

## 2022-05-04 PROCEDURE — 82962 GLUCOSE BLOOD TEST: CPT

## 2022-05-04 PROCEDURE — 36415 COLL VENOUS BLD VENIPUNCTURE: CPT | Performed by: INTERNAL MEDICINE

## 2022-05-04 RX ORDER — NICOTINE POLACRILEX 4 MG
15-30 LOZENGE BUCCAL
Status: DISCONTINUED | OUTPATIENT
Start: 2022-05-04 | End: 2022-05-05 | Stop reason: HOSPADM

## 2022-05-04 RX ORDER — DEXTROSE MONOHYDRATE 25 G/50ML
25-50 INJECTION, SOLUTION INTRAVENOUS
Status: DISCONTINUED | OUTPATIENT
Start: 2022-05-04 | End: 2022-05-05 | Stop reason: HOSPADM

## 2022-05-04 RX ORDER — HYDROCODONE BITARTRATE AND ACETAMINOPHEN 5; 325 MG/1; MG/1
1-2 TABLET ORAL EVERY 4 HOURS PRN
Qty: 20 TABLET | Refills: 0 | Status: SHIPPED | OUTPATIENT
Start: 2022-05-04

## 2022-05-04 RX ORDER — AMOXICILLIN 500 MG/1
500 CAPSULE ORAL 3 TIMES DAILY
Qty: 21 CAPSULE | Refills: 0 | Status: SHIPPED | OUTPATIENT
Start: 2022-05-04 | End: 2022-05-11

## 2022-05-04 RX ORDER — IBUPROFEN 600 MG/1
600 TABLET, FILM COATED ORAL EVERY 6 HOURS PRN
Qty: 16 TABLET | Refills: 0 | Status: SHIPPED | OUTPATIENT
Start: 2022-05-04

## 2022-05-04 RX ADMIN — AMPICILLIN SODIUM 1 G: 1 INJECTION, POWDER, FOR SOLUTION INTRAMUSCULAR; INTRAVENOUS at 19:59

## 2022-05-04 RX ADMIN — VENLAFAXINE HYDROCHLORIDE 150 MG: 150 CAPSULE, EXTENDED RELEASE ORAL at 09:24

## 2022-05-04 RX ADMIN — BUSPIRONE HYDROCHLORIDE 5 MG: 5 TABLET ORAL at 20:15

## 2022-05-04 RX ADMIN — KETOROLAC TROMETHAMINE 15 MG: 15 INJECTION, SOLUTION INTRAMUSCULAR; INTRAVENOUS at 14:46

## 2022-05-04 RX ADMIN — ATORVASTATIN CALCIUM 10 MG: 10 TABLET, FILM COATED ORAL at 20:39

## 2022-05-04 RX ADMIN — OXYCODONE HYDROCHLORIDE 10 MG: 5 TABLET ORAL at 00:22

## 2022-05-04 RX ADMIN — OXYCODONE HYDROCHLORIDE 10 MG: 5 TABLET ORAL at 06:52

## 2022-05-04 RX ADMIN — ACETAMINOPHEN 975 MG: 325 TABLET ORAL at 11:49

## 2022-05-04 RX ADMIN — OXYCODONE HYDROCHLORIDE 10 MG: 5 TABLET ORAL at 17:53

## 2022-05-04 RX ADMIN — KETOROLAC TROMETHAMINE 15 MG: 15 INJECTION, SOLUTION INTRAMUSCULAR; INTRAVENOUS at 09:25

## 2022-05-04 RX ADMIN — ACETAMINOPHEN 975 MG: 325 TABLET ORAL at 02:36

## 2022-05-04 RX ADMIN — AMPICILLIN SODIUM 1 G: 1 INJECTION, POWDER, FOR SOLUTION INTRAMUSCULAR; INTRAVENOUS at 02:37

## 2022-05-04 RX ADMIN — AMPICILLIN SODIUM 1 G: 1 INJECTION, POWDER, FOR SOLUTION INTRAMUSCULAR; INTRAVENOUS at 11:49

## 2022-05-04 RX ADMIN — BUSPIRONE HYDROCHLORIDE 5 MG: 5 TABLET ORAL at 09:24

## 2022-05-04 RX ADMIN — SENNOSIDES AND DOCUSATE SODIUM 1 TABLET: 50; 8.6 TABLET ORAL at 20:15

## 2022-05-04 RX ADMIN — KETOROLAC TROMETHAMINE 15 MG: 15 INJECTION, SOLUTION INTRAMUSCULAR; INTRAVENOUS at 02:37

## 2022-05-04 RX ADMIN — SENNOSIDES AND DOCUSATE SODIUM 1 TABLET: 50; 8.6 TABLET ORAL at 09:24

## 2022-05-04 RX ADMIN — FAMOTIDINE 20 MG: 20 TABLET ORAL at 09:26

## 2022-05-04 RX ADMIN — FAMOTIDINE 20 MG: 20 TABLET ORAL at 20:15

## 2022-05-04 RX ADMIN — ACETAMINOPHEN 975 MG: 325 TABLET ORAL at 20:15

## 2022-05-04 NOTE — PLAN OF CARE
Pt arrived to the unit about 1835. VSS, on room air. Dried blood in right nare. Pt frustrated and restless at arrival, stated he doesn't feel well and wants to sleep. C/o severe pain, dilaudid given and now appears to be resting comfortably.

## 2022-05-04 NOTE — PROGRESS NOTES
"PRIMARY DIAGNOSIS: \"Maxillar fracture with nonunion\"  OUTPATIENT/OBSERVATION GOALS TO BE MET BEFORE DISCHARGE:  1. ADLs back to baseline: Yes    2. Activity and level of assistance: Up with standby assistance.    3. Pain status: Improved-controlled with oral pain medications.    4. Return to near baseline physical activity: Yes     Discharge Planner Nurse   Safe discharge environment identified: Yes  Barriers to discharge: Yes, pt having difficulty with voiding since surgery. Had to have a straight catheterization for bladder scan> 860 ml; 900 o/p noted.   Reports HA and oral pain r/t surgery- treated with scheduled tylenol, Toradol and prn oxycodone.        Entered by: Orin Paniagua RN 05/04/2022 1:55 AM     Please review provider order for any additional goals.   Nurse to notify provider when observation goals have been met and patient is ready for discharge.  "

## 2022-05-04 NOTE — PROVIDER NOTIFICATION
Dr. Bernabe updated regarding pt's difficulty urinating. Pt did not void on day shift. Pt bladder scanned at 1630 for >999. Pt voided approx 1200 in urinal. PVR scan was 810. Peppermint essential oils given, pt ambulated in halls and pt unable to void. Pt currently attempting to void again. Writer advised to bladder scan again and straight cath if needed. If pt continues to retain, advised to place a ba. Discharge cancelled for kourtney, Dr. Bernabe to see tomorrow.   Dr. Ratliff updated as well, advised to defer to Dr. Bernabe.

## 2022-05-04 NOTE — DISCHARGE SUMMARY
[facility]    Discharge Summary      Date of Admission:  5/3/2022  Date of Discharge:  5/4/2022  Discharging Provider: Luis Enrique Bernabe DDS    Discharge Diagnoses   Patient Active Problem List    Diagnosis Date Noted     Maxillary fracture with nonunion 05/03/2022     Priority: Medium     Maxillary hypoplasia 08/07/2018     Priority: Medium     Type 1 diabetes mellitus with hyperglycemia (H) 08/07/2018     Priority: Medium       Procedure/Surgery Information   Procedure: Procedure(s):  OPEN TREATMENT OF MAXILLARY FRACTURE DUE TO NONUNION   Surgeon(s): Surgeon(s) and Role:     * Luis Enrique Bernabe DDS - Primary   Specimens: * No specimens in log *   Non-operative procedures None performed     History of Present Illness   Champ Baker is a 26 year old male presented with a late nonunion of the LeFort I level of he midface.    Hospital Course   Champ Baker was admitted on 5/3/2022.  The following problems were addressed during his hospitalization:  Nonunion of the Maxilla: The patient underwent exposure, debridement, hardware removal, reduction with internal fixation of the maxilla.  The patient's perioperative course went well.  He was admitted for observation postoperatively.  Consultation was made with the hospitalist for diabetes management.  The patient did very well overnight.  He has had some urinary retention.  Anticipate this will resolve this morning and he will be discharged to home.      Post-operative pain control:will be Percocet on discharge.     Medications discontinued or adjusted during this hospitalization: No change     Antibiotics prescribed at discharge: Amxicillin, Duration: 1 week     Imaging study follow up needs:   -CBCT of Facial Skeleton    Significant Findings: fibrous, nonunion of Maxilla    Luis Enrique Bernabe DDS    Discharge Disposition   Discharged to home   Condition at discharge: Stable    Pending Results   Final pathology results: No pathology submitted  These  results will be followed up by NA  Unresulted Labs Ordered in the Past 30 Days of this Admission     No orders found for last 31 day(s).          Primary Care Physician   MARIA DEL ROSARIO FUNK    Physical Exam   Temp: 98.2  F (36.8  C) Temp src: Axillary BP: 135/75 Pulse: 112   Resp: 19 SpO2: 93 % O2 Device: None (Room air)    Vitals:    05/03/22 1118   Weight: 90.3 kg (199 lb 1.6 oz)     Vital Signs with Ranges  Temp:  [97.1  F (36.2  C)-98.8  F (37.1  C)] 98.2  F (36.8  C)  Pulse:  [] 112  Resp:  [14-20] 19  BP: (109-152)/(54-90) 135/75  SpO2:  [92 %-99 %] 93 %  I/O last 3 completed shifts:  In: 1800 [I.V.:1800]  Out: 2900 [Urine:2600; Blood:300]    Constitutional: awake, alert, cooperative, no apparent distress, and appears stated age  The patient has mild facial edema,  EOMI, he has good hemostasis,  His maxilla is stable and the occlusion is as planned.  His mucosa is pink and blanches with pressure.      Consultations This Hospital Stay   HOSPITALIST IP CONSULT  DIABETES EDUCATION IP CONSULT    Time Spent on this Encounter   I have spent less than 30 minutes on this discharge.    Discharge Orders   No discharge procedures on file.  Discharge Medications   Current Discharge Medication List      CONTINUE these medications which have NOT CHANGED    Details   atorvastatin (LIPITOR) 10 MG tablet [ATORVASTATIN (LIPITOR) 10 MG TABLET] Take 10 mg by mouth at bedtime.      busPIRone (BUSPAR) 5 MG tablet Take 5 mg by mouth 2 times daily      clonazePAM (KLONOPIN) 0.5 MG tablet Take 0.5 mg by mouth 2 times daily as needed for anxiety      glucagon 1 MG kit 1 mg as needed      hydrOXYzine (ATARAX) 10 MG tablet Take 10 mg by mouth every 8 hours as needed for anxiety      insulin aspart (NovoLOG) inject - to fill ambulatory pump by Device route See Admin Instructions 110 units daily      venlafaxine (EFFEXOR-ER) 150 MG 24 hr tablet Take 150 mg by mouth daily           Allergies   Allergies   Allergen Reactions     Other  Environmental Allergy      Cat Dander, Hay fever     Reglan [Metoclopramide] Anxiety     Data   Most Recent 3 CBC's:No lab results found.   Most Recent 3 BMP's:Recent Labs   Lab Test 05/04/22  0911 05/04/22  0509 05/03/22  2053 08/10/18  0435 08/10/18  0346 08/09/18  0753 08/09/18  0713 08/08/18  2030 08/08/18  1829   NA  --   --   --   --  139  --  139  --  138   POTASSIUM  --   --   --   --  4.2  --  3.9  --  4.3   CHLORIDE  --   --   --   --  101  --  105  --  106   CO2  --   --   --   --  28  --  30  --  25   BUN  --   --   --   --  13  --  16  --  18   CR  --   --   --   --  0.83  --  0.82  --  0.84   ANIONGAP  --   --   --   --  10  --  4*  --  7   MANUEL  --   --   --   --  8.9  --  8.3*  --  8.5   * 181* 200*   < > 199*   < > 190*   < > 245*    < > = values in this interval not displayed.     Most Recent 2 LFT's:No lab results found.  Most Recent INR's and Anticoagulation Dosing History:  Anticoagulation Dose History    There is no flowsheet data to display.       Most Recent 3 Troponin's:No lab results found.  Most Recent Cholesterol Panel:No lab results found.  Most Recent 6 Bacteria Isolates From Any Culture (See EPIC Reports for Culture Details):No lab results found.  Most Recent TSH, T4 and A1c Labs:  Recent Labs   Lab Test 08/01/18  1055   A1C 8.6*

## 2022-05-04 NOTE — PROGRESS NOTES
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  Patient has ongoing basal rate via his insulin pump and is insisting on using his insulin pump to manage his sugars during postsurgical period.  We will have diabetes educator see him in the morning.  Patient also has CGM which is blood sugar of 168.  Ordered hypoglycemia protocol.  Patient takes 1 unit of NovoLog for 8 g of carbohydrate.  Patient was on regular diet after surgery.  We will change him to carbohydrate restricted diet of 60 g.  Continue IV fluids      Barriers to discharge: As per oral surgeon    Anticipated discharge date: As per oral surgeon        Subjective  Hospitalist consulted to manage type 1 diabetes in a patient with insulin pump.  Patient is s/p open treatment of maxillary fracture due to malunion.  Patient's parents at bedside.  Patient has postsurgical pain in the mouth and not cooperative with exam.  Parents are aware of the basal rate but patient is currently on.  Patient is insisting on managing his own insulin pump.  He takes a ratio of 1 unit of NovoLog for 8 g of carbohydrate.  Current blood sugar is 168 on CGM.    Review of system is positive for oral pain.  Complete review of systems cannot be obtained    Objective    BP (!) 152/74   Pulse 100   Temp 98.8  F (37.1  C) (Temporal)   Resp 18   Wt 90.3 kg (199 lb 1.6 oz)   SpO2 95%   BMI 28.98 kg/m    Weight:   Wt Readings from Last 5 Encounters:   05/03/22 90.3 kg (199 lb 1.6 oz)   08/09/18 81.8 kg (180 lb 6.4 oz)       I/O last 3 completed shifts:  In: 1000 [I.V.:1000]  Out: -   I/O this shift:  In: 800 [I.V.:800]  Out: 600 [Urine:300; Blood:300]          Physical Exam  In mild distress due to oral pain  No pallor, icterus, clubbing, cyanosis  Body mass index is 28.98 kg/m .  Patient is not cooperative with exam    Pertinent  Labs  ----------------------  Recent Labs   Lab 05/03/22  1128   *     No results for input(s): WBC, HGB, HCT, PLT in the last 168 hours.  No results for input(s): INR in the last 168 hours.  No flowsheet data found.      Pertinent Radiology   Radiology Results: Personally reviewed impression/s  No results found.  EKG Results: not yet available.

## 2022-05-04 NOTE — CONSULTS
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    Background  PCP: Ki Charles Also sees endocrinologist every 6 months    Diabetes History:   10 years, on pump from beginning    Basal Rates:  Rate 1: 1.2 units per hour  28.8 total 24 hour basal units      Carbohydrate Ratio:  0000 to 0000 ;   8 Grams/unit    Correction/Sensitivity:  0000 to 0000   ;   30   mg/dL/unit          Blood Glucose Target Range:  0000 to 0000     ; 110      Active Insulin: __3_ hours  Brand of insulin pump: Tandem Control IQ      Wearing CGMS Dexcom G6 on right lower abdomen  Pump infusion set site: left lower abdomen    Labs:  Hemoglobin A1C: 7.5 7/2021                  Weight: 90.3 kg    DM EDUCATION/COUNSELING:  Completed pump assessment. Patient verbalized understanding of the following:  Sign/symptoms/causes/complications of hyperglycemia, DKA and DKA prevention guidelines, alternative subcutaneous injection regimen if pump malfunctions/discontinued, signs/symptoms/causes/treatment of low bloodsugar.    Reviewed the following: Patient will inform RN when giving an insulin dose using the hospital meter, not adjusting pump rates without consulting hospital provider, disconnection of insulin pump for radiology procedures (CGMS removed) and that if for safety reasons, the insulin pump would be removed and subcutaneous injections would be ordered based on assessed pump rates.    Assessment:  Pump assessment completed, orders entered and patient safe to use insulin pump in the hospital independently.    Recommendations:  1. Patient to self manage pump  2. Will Follow-up with endocrinologist      Thank you,     Deborah Valdes RN, Certified Diabetes Care and     [id number] [place]  [address]  [email or web address]  [email or web address]   Office: [phone]  Pager: [phone]

## 2022-05-04 NOTE — PLAN OF CARE
"PRIMARY DIAGNOSIS: \"GENERIC\" NURSING  OUTPATIENT/OBSERVATION GOALS TO BE MET BEFORE DISCHARGE:  ADLs back to baseline: Yes    Activity and level of assistance: Ambulating independently.    Pain status: Improved-controlled with oral pain medications.    Return to near baseline physical activity: Yes     Discharge Planner Nurse   Safe discharge environment identified: Yes  Barriers to discharge: No Pt has not voided yet this shift and is refusing a bladder scan. Dr. Ratliff notified.        Entered by: Jennifer Choe RN 05/04/2022 3:02 PM     Please review provider order for any additional goals.   Nurse to notify provider when observation goals have been met and patient is ready for discharge.    "

## 2022-05-04 NOTE — PROGRESS NOTES
Daily Progress Note        CODE STATUS:  Full Code    05/04/22  Assessment/Plan:    26-year-old with type 1 diabetes on insulin pump who was admitted for maxillary surgery    Lefort I fracture with nonunion:  -- S/P open treatment of maxillary fracture 5/3/22  -- Routine post op care including pain management, diet advancement etc per surgeon    Type 1DM:  -- A1c 8.6 as of 8/1/2018. Last few blood sugar readings: 146--200--181  -- Patient is alert enough to manage the insulin pump.   -- Diabetes educator to see the patient this morning    Urinary retention, acute  -- Needed straight cath x 2 this morning. Check PVRs. Straight cath prn    Disposition; Per primary team  Barrier to discharge; Per primary team     LOS: 0 days     Subjective:  Interval History: Patient seen and examined. Notes, labs, imaging reports personally reviewed. Patient is new to me. Patient reports doing ok. He is hoping to go home today. Had an issue with bladder retention this morning needing straight cath x 2.    Review of Systems:   As mentioned in subjective.    Patient Active Problem List   Diagnosis     Maxillary hypoplasia     Type 1 diabetes mellitus with hyperglycemia (H)     Maxillary fracture with nonunion       Scheduled Meds:    acetaminophen  975 mg Oral Q8H     ampicillin  1 g Intravenous Q8H     atorvastatin  10 mg Oral At Bedtime     busPIRone  5 mg Oral BID     famotidine  20 mg Oral BID    Or     famotidine  20 mg Intravenous BID     insulin aspart   Device See Admin Instructions     insulin bolus from AMBULATORY PUMP   Subcutaneous 4x Daily AC & HS     ketorolac  15 mg Intravenous Q6H     polyethylene glycol  17 g Oral Daily     senna-docusate  1 tablet Oral BID     sodium chloride (PF)  3 mL Intracatheter Q8H     venlafaxine  150 mg Oral Daily     Continuous Infusions:    sodium chloride 100 mL/hr at 05/03/22 1904     PRN Meds:.[START ON 5/6/2022] acetaminophen, bisacodyl, clonazePAM, glucose **OR** dextrose **OR**  glucagon, diphenhydrAMINE **OR** diphenhydrAMINE, HYDROmorphone, hydrOXYzine, lidocaine 4%, lidocaine (buffered or not buffered), magnesium hydroxide, naloxone **OR** naloxone **OR** naloxone **OR** naloxone, ondansetron **OR** ondansetron, oxyCODONE **OR** oxyCODONE, prochlorperazine **OR** prochlorperazine, sodium chloride (PF)    Objective:  Vital signs in last 24 hours:  Temp:  [97.1  F (36.2  C)-98.8  F (37.1  C)] 98.3  F (36.8  C)  Pulse:  [] 120  Resp:  [14-20] 18  BP: (109-152)/(54-90) 109/70  SpO2:  [92 %-99 %] 94 %        Intake/Output Summary (Last 24 hours) at 5/4/2022 0730  Last data filed at 5/4/2022 0650  Gross per 24 hour   Intake 1800 ml   Output 2900 ml   Net -1100 ml       Physical Exam:    General: Not in obvious distress.  HEENT: NC, AT   Chest: Clear to auscultation bilaterally  Heart: S1S2 normal, regular. No M/R/G  Abdomen: Soft. NT, ND. Bowel sounds- active.  Extremities: No legs swelling  Neuro: Alert and awake, grossly non-focal      Lab Results:(I have personally reviewed the results)    Recent Results (from the past 24 hour(s))   Glucose by meter    Collection Time: 05/03/22 11:28 AM   Result Value Ref Range    GLUCOSE BY METER POCT 146 (H) 70 - 99 mg/dL   Glucose by meter    Collection Time: 05/03/22  8:53 PM   Result Value Ref Range    GLUCOSE BY METER POCT 200 (H) 70 - 99 mg/dL   Glucose by meter    Collection Time: 05/04/22  5:09 AM   Result Value Ref Range    GLUCOSE BY METER POCT 181 (H) 70 - 99 mg/dL       All laboratory and imaging data in the past 24 hours reviewed  Serum Glucose range:   Recent Labs   Lab 05/04/22  0509 05/03/22  2053 05/03/22  1128   * 200* 146*     ABG: No lab results found in last 7 days.  CBC: No results for input(s): WBC, HGB, HCT, MCV, PLT, NEUTROPHIL, LYMPH, MONOCYTE, EOSINOPHIL in the last 168 hours.  Chemistry: No results for input(s): NA, POTASSIUM, CHLORIDE, CO2, BUN, CR, GFRESTIMATED, GLUWH, MANUEL, MAG, PROTTOTAL, ALBUMIN, AST, ALT,  ALKPHOS, BILITOTAL, RICH in the last 168 hours.  Coags:  No results for input(s): INR, PROTIME, PTT in the last 168 hours.    Invalid input(s): APTT  Cardiac Markers:  No results for input(s): CKTOTAL, TROPONINI in the last 168 hours.       No results found.    Latest radiology report personally reviewed.    Note created using dragon voice recognition software so sounds alike errors may have escaped editing.      05/04/2022   Dalton Ratliff MD  Hospitalist, [facility]  Pager: [phone]

## 2022-05-04 NOTE — PROGRESS NOTES
"PRIMARY DIAGNOSIS: \"GENERIC\" NURSING  OUTPATIENT/OBSERVATION GOALS TO BE MET BEFORE DISCHARGE:  1. ADLs back to baseline:Yes     2. Activity and level of assistance: Up with standby assistance.    3. Pain status: Improved-controlled with oral pain medications.    4. Return to near baseline physical activity: Yes     Discharge Planner Nurse   Safe discharge environment identified: Yes  Barriers to discharge: Yes, pt to be able to empty bladder without medical interventions.  Pt had 2nd straight catheterization at 0650 for 1400 ml- clear/elizabeth o/p.       Entered by: Orin Paniagua RN 05/04/2022 5:39 AM     Please review provider order for any additional goals.   Nurse to notify provider when observation goals have been met and patient is ready for discharge.  "

## 2022-05-04 NOTE — PLAN OF CARE
"PRIMARY DIAGNOSIS: \"GENERIC\" NURSING  OUTPATIENT/OBSERVATION GOALS TO BE MET BEFORE DISCHARGE:  ADLs back to baseline: Yes    Activity and level of assistance: Ambulating independently.    Pain status: Improved-controlled with oral pain medications.    Return to near baseline physical activity: Yes     Discharge Planner Nurse   Safe discharge environment identified: Yes  Barriers to discharge: Yes Pt needs to void without difficulty       Entered by: Jennifer Choe RN 05/04/2022 2:59 PM     Please review provider order for any additional goals.   Nurse to notify provider when observation goals have been met and patient is ready for discharge.Goal Outcome Evaluation:                      "

## 2022-05-05 VITALS
RESPIRATION RATE: 18 BRPM | TEMPERATURE: 98.8 F | SYSTOLIC BLOOD PRESSURE: 135 MMHG | DIASTOLIC BLOOD PRESSURE: 88 MMHG | WEIGHT: 199.1 LBS | HEART RATE: 99 BPM | BODY MASS INDEX: 28.98 KG/M2 | OXYGEN SATURATION: 93 %

## 2022-05-05 LAB
GLUCOSE BLDC GLUCOMTR-MCNC: 100 MG/DL (ref 70–99)
GLUCOSE BLDC GLUCOMTR-MCNC: 106 MG/DL (ref 70–99)

## 2022-05-05 PROCEDURE — 82962 GLUCOSE BLOOD TEST: CPT

## 2022-05-05 PROCEDURE — 250N000011 HC RX IP 250 OP 636: Performed by: DENTIST

## 2022-05-05 PROCEDURE — 99214 OFFICE O/P EST MOD 30 MIN: CPT | Mod: GC | Performed by: INTERNAL MEDICINE

## 2022-05-05 PROCEDURE — 250N000013 HC RX MED GY IP 250 OP 250 PS 637: Performed by: DENTIST

## 2022-05-05 RX ADMIN — CLONAZEPAM 0.5 MG: 0.5 TABLET ORAL at 11:55

## 2022-05-05 RX ADMIN — OXYCODONE HYDROCHLORIDE 10 MG: 5 TABLET ORAL at 11:21

## 2022-05-05 RX ADMIN — FAMOTIDINE 20 MG: 20 TABLET ORAL at 08:09

## 2022-05-05 RX ADMIN — BUSPIRONE HYDROCHLORIDE 5 MG: 5 TABLET ORAL at 08:09

## 2022-05-05 RX ADMIN — ACETAMINOPHEN 975 MG: 325 TABLET ORAL at 02:33

## 2022-05-05 RX ADMIN — OXYCODONE HYDROCHLORIDE 10 MG: 5 TABLET ORAL at 15:30

## 2022-05-05 RX ADMIN — VENLAFAXINE HYDROCHLORIDE 150 MG: 150 CAPSULE, EXTENDED RELEASE ORAL at 08:09

## 2022-05-05 RX ADMIN — ACETAMINOPHEN 975 MG: 325 TABLET ORAL at 11:21

## 2022-05-05 RX ADMIN — AMPICILLIN SODIUM 1 G: 1 INJECTION, POWDER, FOR SOLUTION INTRAMUSCULAR; INTRAVENOUS at 02:34

## 2022-05-05 RX ADMIN — AMPICILLIN SODIUM 1 G: 1 INJECTION, POWDER, FOR SOLUTION INTRAMUSCULAR; INTRAVENOUS at 11:21

## 2022-05-05 RX ADMIN — SENNOSIDES AND DOCUSATE SODIUM 1 TABLET: 50; 8.6 TABLET ORAL at 08:09

## 2022-05-05 RX ADMIN — OXYCODONE HYDROCHLORIDE 10 MG: 5 TABLET ORAL at 06:04

## 2022-05-05 RX ADMIN — POLYETHYLENE GLYCOL 3350 17 G: 17 POWDER, FOR SOLUTION ORAL at 08:09

## 2022-05-05 NOTE — CONSULTS
[facility] CONSULT - URINARY RETENTION     Type of Consult: observation   Place of Service:  [facility]   Reason for Consultation: Urinary retention   Consult Requested by: Dr. Bernabe    History of present illness:  Champ Baker is a 26 year old male pertinent past medical history of type 1 diabetes that was admitted to the hospital open treatment of maxillary fracture 5/3/22. Urology was consulted for urinary retention. History is obtained from patient,  and chart review.       Champ is POD #2  debridement, reduction of maxillary nonunion by Dr. Bernabe.  Copeland catheter was placed for surgical procedure which was removed yesterday and patient has had high PVRs.  He did require straight catheter 1.8 L of retained urine and Copeland catheter placed today after bladder scan was greater than 900 mL on PVR scan, Copeland catheter placed with 1.2 L urine retained.  Patient states since surgery he has had some hesitancy and feelings of incomplete bladder emptying.  No UTI symptoms.      Patient and mother state prior to his surgery he was without any urinary concerns.  No weakness in his urinary stream, hesitancy in his urinary stream or dysuria.  He denies recurrent urinary tract infections.  He has passed spontaneous kidney stones in the past followed with [facility] urologist in [facility] with last stone being over a year ago.  He states he did undergo metabolic stone work-up which was overall unremarkable.  His mother does have history of urinary stones.  No family history of prostate disease, or urologic cancers.      Past medical history :  Past Medical History:   Diagnosis Date     Anxiety      Diabetes mellitus type 1 (H)      Maxillary hypoplasia      Mixed hyperlipidemia      Situational depression        Past surgical history:   Past Surgical History:   Procedure Laterality Date     FINGER SURGERY       MANDIBULAR SAGITTAL SPLIT OSTEOTOMY W/ INTERNAL FIXATION N/A 8/7/2018     Procedure: LEFORT 1 OSTEOTOMY BILATERAL SAGITTAL SPLIT RAMUS OSTEOTOMY;  Surgeon: Luis Enrique Bernabe DDS;  Location: [facility] OR;  Service:      OPEN REDUCTION INTERNAL FIXATION MAXILLA N/A 5/3/2022    Procedure: OPEN TREATMENT OF MAXILLARY FRACTURE DUE TO NONUNION;  Surgeon: Luis Enrique Bernabe DDS;  Location: [facility]     PERIPHERALLY INSERTED CENTRAL CATHETER INSERTION       WISDOM TOOTH EXTRACTION         Social history:  Social History     Socioeconomic History     Marital status: Single     Spouse name: Not on file     Number of children: Not on file     Years of education: Not on file     Highest education level: Not on file   Occupational History     Not on file   Tobacco Use     Smoking status: Never Smoker     Smokeless tobacco: Never Used   Substance and Sexual Activity     Alcohol use: Yes     Comment: Alcoholic Drinks/day: rare     Drug use: No     Sexual activity: Not on file   Other Topics Concern     Not on file   Social History Narrative     Not on file     Social Determinants of Health     Financial Resource Strain: Not on file   Food Insecurity: Not on file   Transportation Needs: Not on file   Physical Activity: Not on file   Stress: Not on file   Social Connections: Not on file   Intimate Partner Violence: Not on file   Housing Stability: Not on file       Medications  Current Facility-Administered Medications   Medication     [START ON 5/6/2022] acetaminophen (TYLENOL) tablet 650 mg     acetaminophen (TYLENOL) tablet 975 mg     ampicillin (OMNIPEN) 1 g vial to attach to  ml bag for ADULTS or 50 ml bag for PEDS     atorvastatin (LIPITOR) tablet 10 mg     bisacodyl (DULCOLAX) Suppository 10 mg     busPIRone (BUSPAR) tablet 5 mg     clonazePAM (klonoPIN) tablet 0.5 mg     glucose gel 15-30 g    Or     dextrose 50 % injection 25-50 mL    Or     glucagon injection 1 mg     glucose gel 15-30 g    Or     dextrose 50 % injection 25-50 mL    Or     glucagon injection 1 mg      diphenhydrAMINE (BENADRYL) capsule 25 mg    Or     diphenhydrAMINE (BENADRYL) injection 25 mg     famotidine (PEPCID) tablet 20 mg    Or     famotidine (PEPCID) injection 20 mg     HYDROmorphone (PF) (DILAUDID) injection 0.5 mg     hydrOXYzine (ATARAX) tablet 10 mg     insulin aspart (NovoLOG/FIASP) 100 UNIT/ML VIAL FOR FILLING PUMP RESERVOIR     insulin basal rate from AMBULATORY PUMP     insulin bolus from AMBULATORY PUMP     lidocaine (LMX4) cream     lidocaine 1 % 0.1-1 mL     magnesium hydroxide (MILK OF MAGNESIA) suspension 30 mL     naloxone (NARCAN) injection 0.2 mg    Or     naloxone (NARCAN) injection 0.4 mg    Or     naloxone (NARCAN) injection 0.2 mg    Or     naloxone (NARCAN) injection 0.4 mg     ondansetron (ZOFRAN-ODT) ODT tab 4 mg    Or     ondansetron (ZOFRAN) injection 4 mg     oxyCODONE (ROXICODONE) tablet 10 mg    Or     oxyCODONE IR (ROXICODONE) tablet 15 mg     polyethylene glycol (MIRALAX) Packet 17 g     prochlorperazine (COMPAZINE) injection 10 mg    Or     prochlorperazine (COMPAZINE) tablet 10 mg     senna-docusate (SENOKOT-S/PERICOLACE) 8.6-50 MG per tablet 1 tablet     sodium chloride (PF) 0.9% PF flush 3 mL     sodium chloride (PF) 0.9% PF flush 3 mL     sodium chloride 0.9% infusion     venlafaxine (EFFEXOR-XR) 24 hr capsule 150 mg       Allergies  Allergies   Allergen Reactions     Other Environmental Allergy      Cat Dander, Hay fever     Reglan [Metoclopramide] Anxiety       Review of systems   12 point review of systems is otherwise negative except what is stated in the HPI.     Physical examinations:  /88 (BP Location: Left arm)   Pulse 99   Temp 98.8  F (37.1  C) (Oral)   Resp 18   Wt 90.3 kg (199 lb 1.6 oz)   SpO2 93%   BMI 28.98 kg/m     GENERAL: NAD, alert, cooperative  HEAD: normocephalic/atraumatic   ABDOMEN: soft, non tender, non distended, no suprapubic fulness/tenderness noted, no CVA tenderness noted   : Copeland catheter in place draining translucent clear  light yellow-tinged urine  SKIN: no rashes or lesions  MUSCULOSKELETAL: moves all four extremities equally, no pedal edema  PSYCHOLOGICAL: alert and oriented, answers questions appropriately    I have personally reviewed the imaging reports above.     ASSESSMENT/PLAN:  Champ Baker is being seen by [facility] for post post operative urinary retention.       - patient with a 16 Serbian Copeland catheter in place, 1.2 L of translucent light yellow-tinged urine drained upon catheter placement.  -No UTI symptoms, patient's retention occurred after anesthesia without prior history of UTIs or urologic concerns, feel it is reasonable to defer UA at this time as low suspicion for infection contributing.  - recommend trying to decrease or stop medication that may be contributing to retention  - ensure the patient is on a bowel regimen  - recommendmaintaining catheter for 1-2 weeks and completing trial void as an outpatient with [facility].  -We will defer tamsulosin at this time as low suspicion for prostatic enlargement contributing given age, I discussed this with patient and mother who agree.   - Old records reviewed - care everywhere     The patient and I discussed the signs/ symptoms and etiologies of urinary retention. We also discussed treatment options and their alternatives, including the risks and benefits of each. We discussed the importance of treatment and that left untreated urinary retention can lead to infection, leaking, renal dysfunction and bladder rupture. Patient demonstrated understanding. All questions and concerns were answered in detail.    Thank you for consulting [facility] regarding this patient's care. Please contact us with questions or concerns.     Gayatri Parker PA-C,  [facility]  [phone]

## 2022-05-05 NOTE — PROGRESS NOTES
Daily Progress Note        CODE STATUS:  Full Code    05/04/22  Assessment/Plan:    26-year-old with type 1 diabetes on insulin pump who was admitted for maxillary surgery    Lefort I fracture with nonunion:  -- S/P open treatment of maxillary fracture 5/3/22  -- Routine post op care including pain management, diet advancement etc per surgeon    Type 1DM:  -- A1c 8.6 as of 8/1/2018. Last few blood sugar readings: 146--200--181  -- Patient is alert enough to manage the insulin pump.   -- Diabetes educator to see the patient this morning    Urinary retention, acute  -- Needed straight cath x 3 yesterday. Unable to go home last evening due to this. If still unable to void or high PVR, ba insertion and outpatient follow up with urologist would be advised.     Disposition; Per primary team  Barrier to discharge; Per primary team     LOS: 0 days     Subjective:  Interval History: Patient seen this morning. He was hopeful that he would be able to void without much PVRs. I got paged later in the day by RN stating that he is still having High PVRs of >999ml. Asked nurse to check with primary team if they are ok with ba insertion and urology follow up.    Review of Systems:   As mentioned in subjective.    Patient Active Problem List   Diagnosis     Maxillary hypoplasia     Type 1 diabetes mellitus with hyperglycemia (H)     Maxillary fracture with nonunion       Scheduled Meds:    acetaminophen  975 mg Oral Q8H     ampicillin  1 g Intravenous Q8H     atorvastatin  10 mg Oral At Bedtime     busPIRone  5 mg Oral BID     famotidine  20 mg Oral BID    Or     famotidine  20 mg Intravenous BID     insulin aspart   Device See Admin Instructions     insulin bolus from AMBULATORY PUMP   Subcutaneous 4x Daily AC & HS     polyethylene glycol  17 g Oral Daily     senna-docusate  1 tablet Oral BID     sodium chloride (PF)  3 mL Intracatheter Q8H     venlafaxine  150 mg Oral Daily     Continuous Infusions:    insulin basal rate for  inpatient ambulatory pump 1.2 Units/hr (05/04/22 1200)     sodium chloride 100 mL/hr at 05/03/22 1904     PRN Meds:.[START ON 5/6/2022] acetaminophen, bisacodyl, clonazePAM, glucose **OR** dextrose **OR** glucagon, glucose **OR** dextrose **OR** glucagon, diphenhydrAMINE **OR** diphenhydrAMINE, HYDROmorphone, hydrOXYzine, lidocaine 4%, lidocaine (buffered or not buffered), magnesium hydroxide, naloxone **OR** naloxone **OR** naloxone **OR** naloxone, ondansetron **OR** ondansetron, oxyCODONE **OR** oxyCODONE, prochlorperazine **OR** prochlorperazine, sodium chloride (PF)    Objective:  Vital signs in last 24 hours:  Temp:  [97.8  F (36.6  C)-98.2  F (36.8  C)] 98  F (36.7  C)  Pulse:  [108-112] 110  Resp:  [18-20] 18  BP: (132-137)/(75-87) 137/86  SpO2:  [92 %-96 %] 96 %        Intake/Output Summary (Last 24 hours) at 5/4/2022 0730  Last data filed at 5/4/2022 0650  Gross per 24 hour   Intake 1800 ml   Output 2900 ml   Net -1100 ml       Physical Exam:    General: Not in obvious distress.  HEENT: NC, AT   Chest: Clear to auscultation bilaterally  Heart: S1S2 normal, regular. No M/R/G  Abdomen: Soft. NT, ND. Bowel sounds- active.  Extremities: No legs swelling  Neuro: Alert and awake, grossly non-focal      Lab Results:(I have personally reviewed the results)    Recent Results (from the past 24 hour(s))   Glucose by meter    Collection Time: 05/04/22  9:11 AM   Result Value Ref Range    GLUCOSE BY METER POCT 191 (H) 70 - 99 mg/dL   Hemoglobin A1c    Collection Time: 05/04/22  1:32 PM   Result Value Ref Range    Hemoglobin A1C 7.8 (H) <=5.6 %   Glucose by meter    Collection Time: 05/04/22  8:33 PM   Result Value Ref Range    GLUCOSE BY METER POCT 168 (H) 70 - 99 mg/dL   Glucose by meter    Collection Time: 05/05/22  5:15 AM   Result Value Ref Range    GLUCOSE BY METER POCT 100 (H) 70 - 99 mg/dL       All laboratory and imaging data in the past 24 hours reviewed  Serum Glucose range:   Recent Labs   Lab 05/05/22  0575  05/04/22 2033 05/04/22  0911 05/04/22  0509   * 168* 191* 181*     ABG: No lab results found in last 7 days.  CBC: No results for input(s): WBC, HGB, HCT, MCV, PLT, NEUTROPHIL, LYMPH, MONOCYTE, EOSINOPHIL in the last 168 hours.  Chemistry: No results for input(s): NA, POTASSIUM, CHLORIDE, CO2, BUN, CR, GFRESTIMATED, GLUWH, MANUEL, MAG, PROTTOTAL, ALBUMIN, AST, ALT, ALKPHOS, BILITOTAL, RICH in the last 168 hours.  Coags:  No results for input(s): INR, PROTIME, PTT in the last 168 hours.    Invalid input(s): APTT  Cardiac Markers:  No results for input(s): CKTOTAL, TROPONINI in the last 168 hours.       No results found.    Latest radiology report personally reviewed.    Note created using dragon voice recognition software so sounds alike errors may have escaped editing.      05/05/2022   Dalton Ratliff MD  Hospitalist, [facility]  Pager: [phone]

## 2022-05-05 NOTE — PLAN OF CARE
"PRIMARY DIAGNOSIS: \"GENERIC\" NURSING  OUTPATIENT/OBSERVATION GOALS TO BE MET BEFORE DISCHARGE:  ADLs back to baseline: Yes    Activity and level of assistance: Ambulating independently.    Pain status: Improved-controlled with oral pain medications.    Return to near baseline physical activity: Yes     Discharge Planner Nurse   Safe discharge environment identified: Yes  Barriers to discharge: Yes       Entered by: Tia Moseley RN 05/05/2022 12:05 PM         Pt voided 400 ml of urine. Did call but RN did not get into the room until 45 minutes after void.  Was scanned. Very anxious in the room. Irritable and moaning. Mother at the bedside. Left as she feels he is more anxious with her there. Wants to try again after his antibiotic is done stating he is going to refuse a ba. Doesn't want to go home with it. Did administer a prn clonazepam.   Please review provider order for any additional goals.   Nurse to notify provider when observation goals have been met and patient is ready for discharge.Goal Outcome Evaluation:                      "

## 2022-05-05 NOTE — PROGRESS NOTES
Pt has slept good. Tachycardic, vitals otherwise ok. Afebrile. Reporting mild jaw pain middle of night, managed with scheduled tylenol and 1x prn oxycodone. Small amount of bloody drainage from bilateral nostrils. Has not voided yet overnight, encouraging pt to try urinating but pt states he does not have to go and has not been drinking much fluids. Per evening nurse pt drinking a very large amount of fluids thinking he is dehydrated. Told pt we will bladder scan at 0500 if he does not urinate, pt ok with this. Per surgeon Dr. Bernabe nursing staff to place ba if retaining urine again.     - 0515 bladder scan result of 80 ml, pt still with no urge to void.     PRIMARY DIAGNOSIS: Maxillary fracture with nonunion  OUTPATIENT/OBSERVATION GOALS TO BE MET BEFORE DISCHARGE:  1. ADLs back to baseline: Yes    2. Activity and level of assistance: Ambulating independently.    3. Pain status: Improved-controlled with oral pain medications.    4. Return to near baseline physical activity: Yes     Discharge Planner Nurse   Safe discharge environment identified: Yes  Barriers to discharge: Yes- urinary retention       Entered by: Irma Rothman RN 05/05/2022 4:20 AM     Please review provider order for any additional goals.   Nurse to notify provider when observation goals have been met and patient is ready for discharge.

## 2022-05-05 NOTE — PROGRESS NOTES
[facility]    Progress Note     Assessment & Plan   Champ Baker is a 26 year old male who was admitted on 5/3/2022.  Mr. Baker underwent a debridement, reduction of maxillary nonunion.  His postoperative course has gone as expected except for urinary retention.  We will plan on discharging the patient home today after he voids or a Ba is placed and follow-up with urology is scheduled.      Disposition Plan   Expected discharge: Today, recommended to prior living arrangement once voided or ba placed with urology follow up.     Entered: Luis Enrique Bernabe DDS 05/05/2022, 6:50 AM   Information in the above section will display in the discharge planner report.    Luis Enrique Bernabe DDS      Physical Exam   Temp: 98  F (36.7  C) Temp src: Axillary BP: 137/86 Pulse: 110   Resp: 18 SpO2: 96 % O2 Device: None (Room air)    Vitals:    05/03/22 1118   Weight: 90.3 kg (199 lb 1.6 oz)     Vital Signs with Ranges  Temp:  [97.8  F (36.6  C)-98.2  F (36.8  C)] 98  F (36.7  C)  Pulse:  [108-112] 110  Resp:  [18-20] 18  BP: (132-137)/(75-87) 137/86  SpO2:  [92 %-96 %] 96 %  I/O last 3 completed shifts:  In: -   Out: 5100 [Urine:5100]    Constitutional: awake, alert, cooperative and no apparent distress  Eyes: lids and lashes normal, pupils equal, round and reactive to light, extra-ocular muscles intact and vision intact  ENT: Normocephalic, without obvious abnormality,moderate facial edemas as expected,  God hemostatis, the occlusion is as expected, sinuses nontender on palpation, external ears without lesions, oral pharynx with moist mucous membranes, tonsils without erythema or exudates, gums normal and good dentition.,   Respiratory: no increased work of breathing, good air exchange and no retractions  Cardiovascular: regular rate and rhythm  Genitounirinary: urinary retension      Medications     insulin basal rate for inpatient ambulatory pump 1.2 Units/hr (05/04/22 1200)     sodium chloride 100  mL/hr at 05/03/22 1904       acetaminophen  975 mg Oral Q8H     ampicillin  1 g Intravenous Q8H     atorvastatin  10 mg Oral At Bedtime     busPIRone  5 mg Oral BID     famotidine  20 mg Oral BID    Or     famotidine  20 mg Intravenous BID     insulin aspart   Device See Admin Instructions     insulin bolus from AMBULATORY PUMP   Subcutaneous 4x Daily AC & HS     polyethylene glycol  17 g Oral Daily     senna-docusate  1 tablet Oral BID     sodium chloride (PF)  3 mL Intracatheter Q8H     venlafaxine  150 mg Oral Daily       Data   Results for orders placed or performed during the hospital encounter of 05/03/22 (from the past 24 hour(s))   Glucose by meter   Result Value Ref Range    GLUCOSE BY METER POCT 191 (H) 70 - 99 mg/dL   Hemoglobin A1c   Result Value Ref Range    Hemoglobin A1C 7.8 (H) <=5.6 %   Glucose by meter   Result Value Ref Range    GLUCOSE BY METER POCT 168 (H) 70 - 99 mg/dL   Glucose by meter   Result Value Ref Range    GLUCOSE BY METER POCT 100 (H) 70 - 99 mg/dL

## 2022-05-05 NOTE — PLAN OF CARE
Pt still unable to void. Unable to pass straight cath past prostate. 16 Caude cath used and pt had 1800cc out. Pt reports not feeling discomfort or strong urge to void during this shift. Will continue to monitor. Pain well controlled with scheduled tylenol and oxy given x1. Small amounts of bloody drainage noted from both nares.

## 2022-05-05 NOTE — PLAN OF CARE
"PRIMARY DIAGNOSIS: \"GENERIC\" NURSING  OUTPATIENT/OBSERVATION GOALS TO BE MET BEFORE DISCHARGE:  ADLs back to baseline: Yes    Activity and level of assistance: Ambulating independently.    Pain status: Improved-controlled with oral pain medications.    Return to near baseline physical activity: Yes     Discharge Planner Nurse   Safe discharge environment identified: Yes  Barriers to discharge:  will check pvr, has been having retention issues.  Will call when voids for scan.        Entered by: Tia Moseley RN 05/05/2022 12:03 PM     Please review provider order for any additional goals.   Nurse to notify provider when observation goals have been met and patient is ready for discharge.Goal Outcome Evaluation:                      "

## 2022-05-05 NOTE — SIGNIFICANT EVENT
Pt voided twice today for 400 ml but both times bladder scanned for >999. Placed ba cath per orders and urology consult. Pt is very anxious. All cares explained in detail. Will education on leg bag and ba care once he become a bit more calmed.      1200 out from the ba instantly.

## 2022-05-15 ENCOUNTER — NURSE TRIAGE (OUTPATIENT)
Dept: NURSING | Facility: CLINIC | Age: 27
End: 2022-05-15
Payer: COMMERCIAL

## 2022-05-15 ENCOUNTER — HOSPITAL ENCOUNTER (EMERGENCY)
Facility: HOSPITAL | Age: 27
Discharge: HOME | End: 2022-05-15
Attending: EMERGENCY MEDICINE | Admitting: EMERGENCY MEDICINE
Payer: COMMERCIAL

## 2022-05-15 VITALS
OXYGEN SATURATION: 95 % | TEMPERATURE: 98.9 F | RESPIRATION RATE: 16 BRPM | HEART RATE: 126 BPM | DIASTOLIC BLOOD PRESSURE: 95 MMHG | BODY MASS INDEX: 26.66 KG/M2 | WEIGHT: 180 LBS | SYSTOLIC BLOOD PRESSURE: 147 MMHG | HEIGHT: 69 IN

## 2022-05-15 DIAGNOSIS — T83.011A MALFUNCTION OF FOLEY CATHETER, INITIAL ENCOUNTER (H): ICD-10-CM

## 2022-05-15 PROCEDURE — 99281 EMR DPT VST MAYX REQ PHY/QHP: CPT

## 2022-05-16 NOTE — DISCHARGE INSTRUCTIONS
Your Copeland catheter was removed tonight.  If you develop any difficulty urinating and are completely unable to pass urine, return to the ER for recheck.  Otherwise, follow-up with your urologist as an outpatient for further evaluation.

## 2022-05-16 NOTE — ED TRIAGE NOTES
Pt reports that he had a jaw surgery 2 weeks ago. Afterwards he started having trouble urinating. Catheter was placed on Thursday 05/05 and pt was supposed to follow up with urology. Pt reports that he is having irritation and pain in the urethra and the tip of his penis and the catheter attachment device has peeled off. Pt tried ti tape it at home but did not work. Catheter is still draining fine.

## 2022-05-16 NOTE — ED PROVIDER NOTES
EMERGENCY DEPARTMENT ENCOUnter      NAME: Champ Baker  AGE: 26 year old male  YOB: 1995  MRN: [id number]  EVALUATION DATE & TIME: 5/15/2022 10:35 PM    PCP: [facility]    ED PROVIDER: Leon Oakes DO      Chief Complaint   Patient presents with     Catheter Problem         FINAL IMPRESSION:  1. Malfunction of Copeland catheter, initial encounter (H)          ED COURSE & MEDICAL DECISION MAKIN:00 PM I met with the patient, obtained history, performed an initial exam, and discussed options and plan for diagnostics and treatment here in the ED.  11:30 PM We discussed the plan for discharge and the patient is agreeable. Reviewed supportive cares, symptomatic treatment, outpatient follow up, and reasons to return to the Emergency Department. Patient to be discharged by ED RN.       The patient presented emerged part today with complaints of discomfort with his Copeland catheter.  He states that he is due to have this out soon.  There was some irritation noted at the tip of the penis.  Given his discomfort with the catheter, we elected to remove it.  He was able to void easily after the catheter was removed.  He was comfortable with the plan for discharge home without the catheter in place.  He was instructed to return right away if he has any urinary retention or other concerns.  No other current complaints.  Of note, his initial tachycardia resolved during his stay.      At the conclusion of the encounter I discussed the results of all of the tests and the disposition. The questions were answered. The patient or family acknowledged understanding and was agreeable with the care plan.          =================================================================    HPI        Champ Baker is a 26 year old male with a pertinent history of DM type 1 who presents to this ED via private auto for evaluation of catheter irritation.     Upon chart review: Patient underwent an open treatment of  maxillary fracture due to nonunion on 5/3. Following procedure he was admitted for observation. On 5/5 he had a urinary catheter placed due to retention and was told to follow up with urology 2 weeks later.     Patient reports onset of penile irritation due to his catheter on 5/13. The irritation is described to begin at the tip of his penis and radiate upward. 2 hours ago, the cathter attachment device peeled off and he now has more severe pain since the catheter feels like it is being tugged. Per triage note, he did attempt to tape it back on but this was unsuccessful. Movement of the catheter provokes additional pain.     Patient has an appointment with Urology on Thursday, 5/19. He denies any fevers, chills, or any other complaints at this time.       REVIEW OF SYSTEMS     Constitutional:  Denies fever or chills  HENT:  Denies sore throat   Respiratory:  Denies cough or shortness of breath   Cardiovascular:  Denies chest pain or palpitations  GI:  Denies abdominal pain, nausea, or vomiting  : Positive for penile pain.   Musculoskeletal:  Denies any new extremity pain   Skin:  Denies rash   Neurologic:  Denies headache, focal weakness or sensory changes    All other systems reviewed and are negative      PAST MEDICAL HISTORY:  Past Medical History:   Diagnosis Date     Anxiety      Diabetes mellitus type 1 (H)      Maxillary hypoplasia      Mixed hyperlipidemia      Situational depression        PAST SURGICAL HISTORY:  Past Surgical History:   Procedure Laterality Date     FINGER SURGERY       MANDIBULAR SAGITTAL SPLIT OSTEOTOMY W/ INTERNAL FIXATION N/A 8/7/2018    Procedure: LEFORT 1 OSTEOTOMY BILATERAL SAGITTAL SPLIT RAMUS OSTEOTOMY;  Surgeon: Luis Enrique Bernabe DDS;  Location: [facility] [place];  Service:      OPEN REDUCTION INTERNAL FIXATION MAXILLA N/A 5/3/2022    Procedure: OPEN TREATMENT OF MAXILLARY FRACTURE DUE TO NONUNION;  Surgeon: Luis Enrique Bernabe DDS;  [facility]     PERIPHERALLY  "INSERTED CENTRAL CATHETER INSERTION       WISDOM TOOTH EXTRACTION             CURRENT MEDICATIONS:    atorvastatin (LIPITOR) 10 MG tablet  busPIRone (BUSPAR) 5 MG tablet  clonazePAM (KLONOPIN) 0.5 MG tablet  glucagon 1 MG kit  HYDROcodone-acetaminophen (NORCO) 5-325 MG tablet  hydrOXYzine (ATARAX) 10 MG tablet  ibuprofen (ADVIL/MOTRIN) 600 MG tablet  insulin aspart (NovoLOG) inject - to fill ambulatory pump  venlafaxine (EFFEXOR-ER) 150 MG 24 hr tablet        ALLERGIES:  Allergies   Allergen Reactions     Other Environmental Allergy      Cat Dander, Hay fever     Reglan [Metoclopramide] Anxiety       FAMILY HISTORY:  Family History   Problem Relation Age of Onset     Hyperlipidemia Mother      Hyperlipidemia Father      Diabetes Maternal Grandmother      Hypertension Maternal Grandmother      Cancer Maternal Grandmother      Diabetes Maternal Grandfather      Hypertension Maternal Grandfather      Cerebrovascular Disease Maternal Grandfather      Hypertension Paternal Grandmother      Cancer Paternal Grandmother      Hypertension Paternal Grandfather      Diabetes Paternal Grandfather      Cancer Paternal Grandfather      Cerebrovascular Disease Paternal Grandfather        SOCIAL HISTORY:   Social History     Socioeconomic History     Marital status: Single     Spouse name: None     Number of children: None     Years of education: None     Highest education level: None   Tobacco Use     Smoking status: Never Smoker     Smokeless tobacco: Never Used   Substance and Sexual Activity     Alcohol use: Yes     Comment: Alcoholic Drinks/day: rare     Drug use: No       VITALS:  Patient Vitals for the past 24 hrs:   BP Temp Temp src Pulse Resp SpO2 Height Weight   05/15/22 2114 (!) 147/95 98.9  F (37.2  C) Oral (!) 126 16 95 % 1.753 m (5' 9\") 81.6 kg (180 lb)       PHYSICAL EXAM    Constitutional:  Well developed, Well nourished,  HENT:  Normocephalic, Atraumatic, Bilateral external ears normal, Oropharynx moist, Nose " normal.   Neck:  Normal range of motion, No meningismus, No stridor.   Eyes:  EOMI, Conjunctiva normal, No discharge.   Respiratory:  Normal breath sounds, No respiratory distress, No wheezing, No chest tenderness.   Cardiovascular: Tachycardic, Normal rhythm, No murmurs  GI:  Soft, No tenderness, No guarding, No CVA tenderness.   : Copeland catheter in place. Mild irritation at the glands of the penis. Catheter appears to be functioning appropriately.   Musculoskeletal:   No tenderness to palpation or major deformities noted.   Integument:  Warm, Dry, No erythema, No rash.    Neurologic:  Alert & oriented x 3, Normal motor function, Normal sensory function, No focal deficits noted.   Psychiatric:  Affect normal, Judgment normal, Mood normal.        I, Sara Guadarrama, am serving as a scribe to document services personally performed by Dr. Oakes based on my observation and the provider's statements to me. I, Leon Oakes, DO attest that Sara Guadarrama is acting in a scribe capacity, has observed my performance of the services and has documented them in accordance with my direction.    Leon Oakes, DO  Emergency Medicine  [facility] EMERGENCY DEPARTMENT  [facility] [address]  [phone]  Dept: [phone]     Leon Oakes MD  05/16/22 8417

## 2022-05-16 NOTE — TELEPHONE ENCOUNTER
Call received from motherKyara  No Consent to Communicate  Call changed to speaking directly with pt    Champ has had a Copeland catheter in place since he was hospitalized earlier this month. He was discharged on 5/5/22    Today, the device securing the tubing to his leg has broken  He has tried securing the tubing with medical tape but it won't stay on/in place.   This causes discomfort due to tension on the catheter tubing.    In addition, he reports urethral irritation for the past few days that has worsened over the past day.    Denies urethral bleeding, hematuria, cloudy urine.  Catheter is still draining urine. There is no urine leaking around he catheter.    He has an appointment with [facility] on Thu 5/19/22 for a void test and catheter removal  He has not been a pt in that clinic in the past    Advised to be seen tonight - ER likely    COVID 19 Nurse Triage Plan/Patient Instructions    Please be aware that novel coronavirus (COVID-19) may be circulating in the community. If you develop symptoms such as fever, cough, or SOB or if you have concerns about the presence of another infection including coronavirus (COVID-19), please contact your health care provider or visit [email or web address].     Disposition/Instructions    ED Visit recommended. Follow protocol based instructions.     Bring Your Own Device:  Please also bring your smart device(s) (smart phones, tablets, laptops) and their charging cables for your personal use and to communicate with your care team during your visit.    Thank you for taking steps to prevent the spread of this virus.  o Limit your contact with others.  o Wear a simple mask to cover your cough.  o Wash your hands well and often.    Resources    [facility]: About COVID-19: [email or web address]    CDC: What to Do If You're Sick: www.cdc.gov/coronavirus/2019-ncov/about/steps-when-sick.html    CDC: Ending Home Isolation:  www.cdc.gov/coronavirus/2019-ncov/hcp/disposition-in-home-patients.html     CDC: Caring for Someone: www.cdc.gov/coronavirus/2019-ncov/if-you-are-sick/care-for-someone.html     [facility]: Interim Guidance for Hospital Discharge to Home: www.health.[facility].[place]/diseases/coronavirus/hcp/hospdischarge.pdf    [facility] clinical trials (COVID-19 research studies): [email or web address]     Below are the COVID-19 hotlines at the [place] [facility] ([facility]). Interpreters are available.   o For health questions: Call 396-897-6805 or 1-148.787.8587 (7 a.m. to 7 p.m.)  o For questions about schools and childcare: Call 020-846-1655 or 1-631.136.7654 (7 a.m. to 7 p.m.)     Aileen Almanza RN  [facility]      Reason for Disposition    Patient sounds very sick or weak to the triager    Additional Information    Negative: Shock suspected (e.g., cold/pale/clammy skin, too weak to stand, low BP, rapid pulse)    Negative: Sounds like a life-threatening emergency to the triager    Negative: [1] Catheter was accidentally pulled-out AND [2] bright red continuous bleeding    Negative: SEVERE abdominal pain    Negative: [1] Tea-colored or slightly red urine lasts > 24 hours AND [2] not cleared by increasing fluid intake    AND [3] no recent prostate or bladder surgery    Negative: [1] Cloudy urine lasts > 24 hours AND [2] not cleared by increasing fluid intake    Negative: [1] Bloody or red-colored urine  AND [2] no recent prostate or bladder surgery  (Exception: brief episode and urine now clear)    Negative: [1] Bloody or red-colored urine  AND [2] prostate or bladder surgery > 3 days (72 hours) ago    Negative: Urine smells bad    Negative: [1] Catheter is broken or cracked AND [2] is still usable    Negative: Catheter was accidentally pulled-out    Negative: [1] Catheter is broken AND [2] is not usable    Negative: Bleeding around catheter (e.g., from penis or female  urethra)    Negative: Lower abdominal pain or distention    Negative: [1] No urine in bag > 4 hours AND [2] catheter is not kinked    Negative: Leakage of urine around catheter    Negative: Fever > 100.4 F (38.0 C)    Negative: [1] Drinking very little AND [2] dehydration suspected (e.g., no urine > 12 hours, very dry mouth, very lightheaded)    Protocols used: URINARY CATHETER SYMPTOMS AND [id number]

## 2022-05-16 NOTE — ED NOTES
"Pt c/o \"severe\" irritation  from ba cath. C/O pain with any repositioning of cath. Urine flowing well into bag. Bladder scan 5cc. Bladder trail started with 400 ml sterile saline in bladder Ba removed, Pt went to BR and voided approx 400cc clear fluid. Pain resolved, bladder scan 0  "

## 2024-04-19 NOTE — PROGRESS NOTES
PRIMARY DIAGNOSIS: Maxillary fracture with nonunion  OUTPATIENT/OBSERVATION GOALS TO BE MET BEFORE DISCHARGE:  1. ADLs back to baseline: Yes    2. Activity and level of assistance: Ambulating independently.    3. Pain status: Pain free.    4. Return to near baseline physical activity: Yes     Discharge Planner Nurse   Safe discharge environment identified: Yes  Barriers to discharge: Yes- urinary retention       Entered by: Irma Rothman RN 05/05/2022 1:40 AM     Please review provider order for any additional goals.   Nurse to notify provider when observation goals have been met and patient is ready for discharge.   Patient reached out through email stating he did not receive form.  Form re-emailed to patient.

## (undated) DEVICE — DRSG STERI STRIP 1/2X4" R1547

## (undated) DEVICE — GLOVE BIOGEL PI ULTRATOUCH G SZ 8.5 42185

## (undated) DEVICE — BURR ROUND 2.3X44.5MM 14-C0106

## (undated) DEVICE — PREP POVIDONE IODINE SOLUTION 10% 4OZ BOTTLE 29906-004

## (undated) DEVICE — NEEDLE MICRODISSECTION COLORADO E1650

## (undated) DEVICE — GOWN IMPERVIOUS BREATHABLE SMART LG 89015

## (undated) DEVICE — BURR OVAL 4.0X44.5 14-S0903

## (undated) DEVICE — SUCTION MANIFOLD NEPTUNE 2 SYS 1 PORT 702-025-000

## (undated) DEVICE — BAND RUBBER 3 X 1/8 STRL #32 30D083

## (undated) DEVICE — Device

## (undated) DEVICE — ESU HOLSTER PLASTIC DISP E2400

## (undated) DEVICE — ESU CORD BIPOLAR 12' E0512

## (undated) DEVICE — NDL 25GA 1.5" 305127

## (undated) DEVICE — SUTURE VICRYL+ 2-0 27IN SH UND VCP417H

## (undated) DEVICE — CUSTOM PACK ORAL PK ST JOHNS

## (undated) DEVICE — BURR TAPERED 1.6X44.5MM 14-C0608

## (undated) DEVICE — SUTURE VICRYL+ 3-0 27IN CT-1 UND VCP258H

## (undated) DEVICE — SOL WATER IRRIG 1000ML BOTTLE 2F7114

## (undated) DEVICE — SUTURE VICRYL+ 4-0 UNDYED PS-2 VCP496H

## (undated) DEVICE — SUTURE VICRYL+ 4-0 27IN SH UND VCP415H

## (undated) DEVICE — SU PROLENE 5-0 P-3 18" 8698G

## (undated) DEVICE — SU PLAIN FAST ABSORB 5-0 PC-1 18" 1915G

## (undated) DEVICE — LIGACLIP MEDIUM AESCULAP B2180-1

## (undated) DEVICE — SYR 10ML LL W/O NDL 302995

## (undated) DEVICE — SOL ADH LIQUID BENZOIN SWAB 0.6ML C1544

## (undated) DEVICE — SU VICRYL+ 3-0 27IN SH UND VCP416H

## (undated) DEVICE — ESU PENCIL SMOKE EVAC W/ROCKER SWITCH 0703-047-000

## (undated) DEVICE — SU CHROMIC 3-0 FS-2 27" 636

## (undated) DEVICE — CATH TRAY FOLEY SURESTEP 16FR DRAIN BAG STATOCK A899916

## (undated) DEVICE — GOWN IMPERVIOUS BREATHABLE 2XL/XLONG

## (undated) DEVICE — SU MONOCRYL 4-0 PS-2 27" UND Y426H

## (undated) DEVICE — GLOVE BIOGEL PI ULTRATOUCH G SZ 7.0 42170

## (undated) DEVICE — TRAY PREP DRY SKIN SCRUB 067

## (undated) DEVICE — NEEDLE DENTAL 25 GA LONG 0855023

## (undated) RX ORDER — GLYCOPYRROLATE 0.2 MG/ML
INJECTION INTRAMUSCULAR; INTRAVENOUS
Status: DISPENSED
Start: 2022-05-03

## (undated) RX ORDER — FENTANYL CITRATE 50 UG/ML
INJECTION, SOLUTION INTRAMUSCULAR; INTRAVENOUS
Status: DISPENSED
Start: 2022-05-03

## (undated) RX ORDER — ONDANSETRON 2 MG/ML
INJECTION INTRAMUSCULAR; INTRAVENOUS
Status: DISPENSED
Start: 2022-05-03

## (undated) RX ORDER — CHLORHEXIDINE GLUCONATE ORAL RINSE 1.2 MG/ML
SOLUTION DENTAL
Status: DISPENSED
Start: 2022-05-03

## (undated) RX ORDER — PROPOFOL 10 MG/ML
INJECTION, EMULSION INTRAVENOUS
Status: DISPENSED
Start: 2022-05-03

## (undated) RX ORDER — LIDOCAINE HYDROCHLORIDE AND EPINEPHRINE BITARTRATE 20; .01 MG/ML; MG/ML
INJECTION, SOLUTION SUBCUTANEOUS
Status: DISPENSED
Start: 2022-05-03

## (undated) RX ORDER — KETAMINE HYDROCHLORIDE 10 MG/ML
INJECTION INTRAMUSCULAR; INTRAVENOUS
Status: DISPENSED
Start: 2022-05-03